# Patient Record
Sex: FEMALE | Race: WHITE | Employment: FULL TIME | URBAN - METROPOLITAN AREA
[De-identification: names, ages, dates, MRNs, and addresses within clinical notes are randomized per-mention and may not be internally consistent; named-entity substitution may affect disease eponyms.]

---

## 2018-03-14 ENCOUNTER — OFFICE VISIT (OUTPATIENT)
Dept: ORTHOPEDIC SURGERY | Age: 58
End: 2018-03-14
Payer: COMMERCIAL

## 2018-03-14 VITALS
WEIGHT: 175 LBS | HEIGHT: 68 IN | SYSTOLIC BLOOD PRESSURE: 131 MMHG | BODY MASS INDEX: 26.52 KG/M2 | HEART RATE: 64 BPM | DIASTOLIC BLOOD PRESSURE: 88 MMHG

## 2018-03-14 DIAGNOSIS — M25.511 ACUTE PAIN OF RIGHT SHOULDER: Primary | ICD-10-CM

## 2018-03-14 PROCEDURE — 99203 OFFICE O/P NEW LOW 30 MIN: CPT | Performed by: ORTHOPAEDIC SURGERY

## 2018-03-14 RX ORDER — PANTOPRAZOLE SODIUM 40 MG/1
40 GRANULE, DELAYED RELEASE ORAL
COMMUNITY
End: 2019-06-03 | Stop reason: DRUGHIGH

## 2018-03-14 RX ORDER — RANITIDINE 150 MG/1
150 CAPSULE ORAL DAILY
COMMUNITY
End: 2020-02-05

## 2018-03-14 RX ORDER — ATORVASTATIN CALCIUM 40 MG/1
40 TABLET, FILM COATED ORAL DAILY
COMMUNITY
End: 2019-09-11 | Stop reason: SDUPTHER

## 2018-03-14 RX ORDER — ALPRAZOLAM 0.5 MG/1
0.5 TABLET ORAL NIGHTLY PRN
COMMUNITY
End: 2019-06-03 | Stop reason: SDUPTHER

## 2018-03-14 RX ORDER — ATENOLOL 50 MG/1
50 TABLET ORAL DAILY
COMMUNITY
End: 2020-02-05 | Stop reason: SDUPTHER

## 2018-03-14 RX ORDER — LOSARTAN POTASSIUM 100 MG/1
100 TABLET ORAL DAILY
COMMUNITY
End: 2019-06-03

## 2018-03-14 RX ORDER — CITALOPRAM 40 MG/1
40 TABLET ORAL DAILY
COMMUNITY
End: 2019-06-03 | Stop reason: SDUPTHER

## 2018-03-14 RX ORDER — AMLODIPINE BESYLATE 10 MG/1
10 TABLET ORAL DAILY
COMMUNITY
End: 2019-06-03 | Stop reason: ALTCHOICE

## 2018-03-14 RX ORDER — FLUTICASONE PROPIONATE 50 MCG
1 SPRAY, SUSPENSION (ML) NASAL DAILY
COMMUNITY

## 2018-03-25 ENCOUNTER — ANESTHESIA EVENT (OUTPATIENT)
Dept: OPERATING ROOM | Age: 58
End: 2018-03-25
Payer: COMMERCIAL

## 2018-03-26 ENCOUNTER — ANESTHESIA (OUTPATIENT)
Dept: OPERATING ROOM | Age: 58
End: 2018-03-26
Payer: COMMERCIAL

## 2018-03-26 ASSESSMENT — ENCOUNTER SYMPTOMS: STRIDOR: 0

## 2018-04-17 ENCOUNTER — ANESTHESIA EVENT (OUTPATIENT)
Dept: OPERATING ROOM | Age: 58
End: 2018-04-17
Payer: COMMERCIAL

## 2018-04-17 ENCOUNTER — HOSPITAL ENCOUNTER (OUTPATIENT)
Age: 58
Setting detail: OUTPATIENT SURGERY
Discharge: HOME | End: 2018-04-17
Attending: ORTHOPAEDIC SURGERY | Admitting: ORTHOPAEDIC SURGERY
Payer: COMMERCIAL

## 2018-04-17 ENCOUNTER — ANESTHESIA (OUTPATIENT)
Dept: OPERATING ROOM | Age: 58
End: 2018-04-17
Payer: COMMERCIAL

## 2018-04-17 VITALS
WEIGHT: 172 LBS | DIASTOLIC BLOOD PRESSURE: 73 MMHG | HEIGHT: 67 IN | TEMPERATURE: 96.8 F | SYSTOLIC BLOOD PRESSURE: 138 MMHG | OXYGEN SATURATION: 93 % | RESPIRATION RATE: 16 BRPM | HEART RATE: 60 BPM | BODY MASS INDEX: 27 KG/M2

## 2018-04-17 VITALS — SYSTOLIC BLOOD PRESSURE: 95 MMHG | DIASTOLIC BLOOD PRESSURE: 50 MMHG | OXYGEN SATURATION: 94 %

## 2018-04-17 DIAGNOSIS — M75.01 ADHESIVE CAPSULITIS OF RIGHT SHOULDER: Primary | ICD-10-CM

## 2018-04-17 PROCEDURE — 3700000000 HC ANESTHESIA ATTENDED CARE: Performed by: ORTHOPAEDIC SURGERY

## 2018-04-17 PROCEDURE — 7100000031 HC ASPR PHASE II RECOVERY - ADDTL 15 MIN: Performed by: ORTHOPAEDIC SURGERY

## 2018-04-17 PROCEDURE — 6360000002 HC RX W HCPCS: Performed by: NURSE ANESTHETIST, CERTIFIED REGISTERED

## 2018-04-17 PROCEDURE — 7100000030 HC ASPR PHASE II RECOVERY - FIRST 15 MIN: Performed by: ORTHOPAEDIC SURGERY

## 2018-04-17 PROCEDURE — 3700000001 HC ADD 15 MINUTES (ANESTHESIA): Performed by: ORTHOPAEDIC SURGERY

## 2018-04-17 PROCEDURE — 2500000003 HC RX 250 WO HCPCS: Performed by: NURSE ANESTHETIST, CERTIFIED REGISTERED

## 2018-04-17 PROCEDURE — 2580000003 HC RX 258: Performed by: ANESTHESIOLOGY

## 2018-04-17 PROCEDURE — 3600000011 HC SURGERY LEVEL 1  ADDTL 15MIN: Performed by: ORTHOPAEDIC SURGERY

## 2018-04-17 PROCEDURE — 6360000002 HC RX W HCPCS: Performed by: ORTHOPAEDIC SURGERY

## 2018-04-17 PROCEDURE — 3600000001 HC SURGERY LEVEL 1  BASE: Performed by: ORTHOPAEDIC SURGERY

## 2018-04-17 PROCEDURE — 7100000000 HC PACU RECOVERY - FIRST 15 MIN: Performed by: ORTHOPAEDIC SURGERY

## 2018-04-17 PROCEDURE — 23700 MNPJ ANES SHO JT FIXJ APRATS: CPT | Performed by: ORTHOPAEDIC SURGERY

## 2018-04-17 PROCEDURE — 7100000001 HC PACU RECOVERY - ADDTL 15 MIN: Performed by: ORTHOPAEDIC SURGERY

## 2018-04-17 PROCEDURE — 64415 NJX AA&/STRD BRCH PLXS IMG: CPT | Performed by: ANESTHESIOLOGY

## 2018-04-17 PROCEDURE — 2500000003 HC RX 250 WO HCPCS: Performed by: ORTHOPAEDIC SURGERY

## 2018-04-17 RX ORDER — BETAMETHASONE SODIUM PHOSPHATE AND BETAMETHASONE ACETATE 3; 3 MG/ML; MG/ML
INJECTION, SUSPENSION INTRA-ARTICULAR; INTRALESIONAL; INTRAMUSCULAR; SOFT TISSUE PRN
Status: DISCONTINUED | OUTPATIENT
Start: 2018-04-17 | End: 2018-04-17 | Stop reason: HOSPADM

## 2018-04-17 RX ORDER — MIDAZOLAM HYDROCHLORIDE 1 MG/ML
INJECTION INTRAMUSCULAR; INTRAVENOUS PRN
Status: DISCONTINUED | OUTPATIENT
Start: 2018-04-17 | End: 2018-04-17 | Stop reason: SDUPTHER

## 2018-04-17 RX ORDER — BUPIVACAINE HYDROCHLORIDE 5 MG/ML
INJECTION, SOLUTION EPIDURAL; INTRACAUDAL PRN
Status: DISCONTINUED | OUTPATIENT
Start: 2018-04-17 | End: 2018-04-17 | Stop reason: HOSPADM

## 2018-04-17 RX ORDER — MORPHINE SULFATE 2 MG/ML
2 INJECTION, SOLUTION INTRAMUSCULAR; INTRAVENOUS EVERY 5 MIN PRN
Status: DISCONTINUED | OUTPATIENT
Start: 2018-04-17 | End: 2018-04-17 | Stop reason: HOSPADM

## 2018-04-17 RX ORDER — LIDOCAINE HYDROCHLORIDE 10 MG/ML
INJECTION, SOLUTION EPIDURAL; INFILTRATION; INTRACAUDAL; PERINEURAL PRN
Status: DISCONTINUED | OUTPATIENT
Start: 2018-04-17 | End: 2018-04-17 | Stop reason: SDUPTHER

## 2018-04-17 RX ORDER — ONDANSETRON 2 MG/ML
4 INJECTION INTRAMUSCULAR; INTRAVENOUS
Status: DISCONTINUED | OUTPATIENT
Start: 2018-04-17 | End: 2018-04-17 | Stop reason: HOSPADM

## 2018-04-17 RX ORDER — OXYCODONE HYDROCHLORIDE AND ACETAMINOPHEN 5; 325 MG/1; MG/1
1-2 TABLET ORAL EVERY 4 HOURS PRN
Qty: 30 TABLET | Refills: 0 | Status: SHIPPED | OUTPATIENT
Start: 2018-04-17 | End: 2019-04-17

## 2018-04-17 RX ORDER — SODIUM CHLORIDE, SODIUM LACTATE, POTASSIUM CHLORIDE, CALCIUM CHLORIDE 600; 310; 30; 20 MG/100ML; MG/100ML; MG/100ML; MG/100ML
INJECTION, SOLUTION INTRAVENOUS CONTINUOUS
Status: DISCONTINUED | OUTPATIENT
Start: 2018-04-17 | End: 2018-04-17 | Stop reason: HOSPADM

## 2018-04-17 RX ORDER — LABETALOL HYDROCHLORIDE 5 MG/ML
5 INJECTION, SOLUTION INTRAVENOUS EVERY 10 MIN PRN
Status: DISCONTINUED | OUTPATIENT
Start: 2018-04-17 | End: 2018-04-17 | Stop reason: HOSPADM

## 2018-04-17 RX ORDER — FENTANYL CITRATE 50 UG/ML
INJECTION, SOLUTION INTRAMUSCULAR; INTRAVENOUS PRN
Status: DISCONTINUED | OUTPATIENT
Start: 2018-04-17 | End: 2018-04-17 | Stop reason: SDUPTHER

## 2018-04-17 RX ORDER — PROPOFOL 10 MG/ML
INJECTION, EMULSION INTRAVENOUS PRN
Status: DISCONTINUED | OUTPATIENT
Start: 2018-04-17 | End: 2018-04-17 | Stop reason: SDUPTHER

## 2018-04-17 RX ORDER — DIPHENHYDRAMINE HYDROCHLORIDE 50 MG/ML
12.5 INJECTION INTRAMUSCULAR; INTRAVENOUS
Status: DISCONTINUED | OUTPATIENT
Start: 2018-04-17 | End: 2018-04-17 | Stop reason: HOSPADM

## 2018-04-17 RX ORDER — ONDANSETRON 2 MG/ML
INJECTION INTRAMUSCULAR; INTRAVENOUS PRN
Status: DISCONTINUED | OUTPATIENT
Start: 2018-04-17 | End: 2018-04-17 | Stop reason: SDUPTHER

## 2018-04-17 RX ADMIN — PROPOFOL 180 MG: 10 INJECTION, EMULSION INTRAVENOUS at 11:26

## 2018-04-17 RX ADMIN — MIDAZOLAM 2 MG: 1 INJECTION INTRAMUSCULAR; INTRAVENOUS at 11:24

## 2018-04-17 RX ADMIN — SODIUM CHLORIDE, POTASSIUM CHLORIDE, SODIUM LACTATE AND CALCIUM CHLORIDE: 600; 310; 30; 20 INJECTION, SOLUTION INTRAVENOUS at 09:57

## 2018-04-17 RX ADMIN — FENTANYL CITRATE 50 MCG: 50 INJECTION, SOLUTION INTRAMUSCULAR; INTRAVENOUS at 11:26

## 2018-04-17 RX ADMIN — LIDOCAINE HYDROCHLORIDE 50 MG: 10 INJECTION, SOLUTION EPIDURAL; INFILTRATION; INTRACAUDAL; PERINEURAL at 11:26

## 2018-04-17 RX ADMIN — FENTANYL CITRATE 50 MCG: 50 INJECTION, SOLUTION INTRAMUSCULAR; INTRAVENOUS at 11:24

## 2018-04-17 RX ADMIN — ONDANSETRON 4 MG: 2 INJECTION INTRAMUSCULAR; INTRAVENOUS at 11:40

## 2018-04-17 ASSESSMENT — PULMONARY FUNCTION TESTS
PIF_VALUE: 1
PIF_VALUE: 2
PIF_VALUE: 13
PIF_VALUE: 28
PIF_VALUE: 14
PIF_VALUE: 0
PIF_VALUE: 0
PIF_VALUE: 26
PIF_VALUE: 1
PIF_VALUE: 0
PIF_VALUE: 2
PIF_VALUE: 0
PIF_VALUE: 1
PIF_VALUE: 0
PIF_VALUE: 0
PIF_VALUE: 16
PIF_VALUE: 2
PIF_VALUE: 15
PIF_VALUE: 0
PIF_VALUE: 17
PIF_VALUE: 0
PIF_VALUE: 0
PIF_VALUE: 15
PIF_VALUE: 0
PIF_VALUE: 1
PIF_VALUE: 1
PIF_VALUE: 15

## 2018-04-17 ASSESSMENT — PAIN SCALES - GENERAL
PAINLEVEL_OUTOF10: 0

## 2018-04-17 ASSESSMENT — PAIN DESCRIPTION - DESCRIPTORS: DESCRIPTORS: ACHING

## 2018-04-17 ASSESSMENT — ENCOUNTER SYMPTOMS: STRIDOR: 0

## 2018-04-17 ASSESSMENT — PAIN DESCRIPTION - PAIN TYPE: TYPE: ACUTE PAIN

## 2018-04-17 ASSESSMENT — PAIN - FUNCTIONAL ASSESSMENT: PAIN_FUNCTIONAL_ASSESSMENT: 0-10

## 2018-05-02 ENCOUNTER — OFFICE VISIT (OUTPATIENT)
Dept: ORTHOPEDIC SURGERY | Age: 58
End: 2018-05-02

## 2018-05-02 DIAGNOSIS — M75.01 ADHESIVE CAPSULITIS OF RIGHT SHOULDER: Primary | ICD-10-CM

## 2018-05-02 PROCEDURE — 99024 POSTOP FOLLOW-UP VISIT: CPT | Performed by: ORTHOPAEDIC SURGERY

## 2019-06-03 ENCOUNTER — OFFICE VISIT (OUTPATIENT)
Dept: FAMILY MEDICINE CLINIC | Age: 59
End: 2019-06-03
Payer: COMMERCIAL

## 2019-06-03 VITALS
SYSTOLIC BLOOD PRESSURE: 144 MMHG | WEIGHT: 174 LBS | DIASTOLIC BLOOD PRESSURE: 82 MMHG | HEIGHT: 67 IN | BODY MASS INDEX: 27.31 KG/M2 | TEMPERATURE: 97.9 F | RESPIRATION RATE: 16 BRPM | HEART RATE: 64 BPM

## 2019-06-03 DIAGNOSIS — I10 ESSENTIAL HYPERTENSION: Primary | ICD-10-CM

## 2019-06-03 DIAGNOSIS — E78.00 PURE HYPERCHOLESTEROLEMIA: ICD-10-CM

## 2019-06-03 DIAGNOSIS — R73.9 HYPERGLYCEMIA: ICD-10-CM

## 2019-06-03 DIAGNOSIS — F41.9 ANXIETY: ICD-10-CM

## 2019-06-03 PROCEDURE — 99203 OFFICE O/P NEW LOW 30 MIN: CPT | Performed by: INTERNAL MEDICINE

## 2019-06-03 RX ORDER — CITALOPRAM 40 MG/1
40 TABLET ORAL DAILY
Qty: 90 TABLET | Refills: 1 | Status: SHIPPED | OUTPATIENT
Start: 2019-06-03 | End: 2020-02-05 | Stop reason: SDUPTHER

## 2019-06-03 RX ORDER — VALSARTAN 40 MG/1
40 TABLET ORAL DAILY
COMMUNITY
End: 2019-11-27

## 2019-06-03 RX ORDER — ALPRAZOLAM 0.5 MG/1
0.5 TABLET ORAL NIGHTLY PRN
Qty: 30 TABLET | Refills: 0 | Status: SHIPPED | OUTPATIENT
Start: 2019-06-03 | End: 2019-07-03

## 2019-06-03 RX ORDER — PANTOPRAZOLE SODIUM 40 MG/1
40 GRANULE, DELAYED RELEASE ORAL
Status: SHIPPED | COMMUNITY
Start: 2019-06-03 | End: 2020-02-05 | Stop reason: SDUPTHER

## 2019-06-03 ASSESSMENT — PATIENT HEALTH QUESTIONNAIRE - PHQ9
SUM OF ALL RESPONSES TO PHQ QUESTIONS 1-9: 0
1. LITTLE INTEREST OR PLEASURE IN DOING THINGS: 0
SUM OF ALL RESPONSES TO PHQ9 QUESTIONS 1 & 2: 0
2. FEELING DOWN, DEPRESSED OR HOPELESS: 0
SUM OF ALL RESPONSES TO PHQ QUESTIONS 1-9: 0

## 2019-06-03 NOTE — PROGRESS NOTES
Chronic Disease Visit Information    BP Readings from Last 3 Encounters:   04/17/18 138/73   04/17/18 (!) 95/50   03/26/18 126/76          BUN (mg/dL)   Date Value   10/01/2013 17     CREATININE (mg/dL)   Date Value   10/01/2013 0.91     Glucose (mg/dL)   Date Value   10/01/2013 111 (H)            Have you changed or started any medications since your last visit including any over-the-counter medicines, vitamins, or herbal medicines? no   Are you having any side effects from any of your medications? -  no  Have you stopped taking any of your medications? Is so, why? -  no    Have you seen any other physician or provider since your last visit? No  Have you had any other diagnostic tests since your last visit? No  Have you been seen in the emergency room and/or had an admission to a hospital since we last saw you? No  Have you had your annual diabetic retinal (eye) exam? No  Have you had your routine dental cleaning in the past 6 months? no    Have you activated your [employer] account? If not, what are your barriers?  No:      Patient Care Team:  Linzie Goldberg, APRN - CNP as PCP - General (Family Medicine)         Medical History Review  Past Medical, Family, and Social History reviewed and does not contribute to the patient presenting condition    Health Maintenance   Topic Date Due    Hepatitis C screen  1960    HIV screen  11/14/1977    DTaP/Tdap/Td vaccine (1 - Tdap) 11/14/1981    Cervical cancer screen  11/14/1983    Lipid screen  11/14/2002    Breast cancer screen  11/14/2012    Shingles Vaccine (1 of 2) 11/14/2012    Colon cancer screen colonoscopy  11/14/2012    Potassium monitoring  10/01/2014    Creatinine monitoring  10/01/2014    Flu vaccine (Season Ended) 09/01/2019    Pneumococcal 0-64 years Vaccine  Aged Abel

## 2019-06-03 NOTE — PROGRESS NOTES
[facility]    New Patient Note/History and Physical    Date of patient's visit: 6/3/2019    Name: Rachel Tapia      YOB: 1960     Patient Care Team:  ELADIA Valdez CNP as PCP - General (Family Medicine)    REASON FOR VISIT: First Visit, establish care     Chief Complaint   Patient presents with    Establish Care       HISTORY OF PRESENTING ILLNESS:    History was obtained from the patient. Rachel Tapia is a 66 y.o. is here to establish care. She has a history of hypertension, hyperlipidemia and anxiety disorder. She also was diagnosed with eosinophilic esophagitis. .  She has a long history of GERD. She is currently on PPI 2 times a day. She had a recent esophageal dilation and biopsy which confirmed eosinophilic esophagitis. She is doing better since being on a PPI. She needs refills on citalopram and Xanax. She takes one tablet of Xanax maybe once a week for severe anxiety. It started after the death of her child and other personal issues. She is up-to-date with mammogram and Pap smear. She sees a gynecologist at [facility].  She also had Cologard testing last year which was normal.      Final Pathologic Diagnosis    1. Stomach, biopsy:         Gastric mucosa with patchy reactive foveolar changes.        Comment: The reactive foveolar changes are nonspecific. There is no evidence of Helicobacter pylori on H&E sections.        2. \"Gastric body polyps\", biopsy:         Fundic gland polyps.        3. \"Gastric fundus polyp\", biopsy:         Fundic type gastric mucosa with no significant pathologic findings.        4. GE junction, biopsy:         Chronically inflamed squamous and gastric-type mucosa.        Comment: There is no evidence of intestinal metaplasia or dysplasia.        5.  Distal esophagus, biopsy:         Chronic esophagitis with eosinophils.        Patient Name: Seth Argueta Procedure Date: 3/11/2019 8:34 AM   MRN: [id number]   Account Number: [de-identified]   YOB: 1960    Admit Type: Outpatient    Age: 60    Room: [facility] 03    Gender: Female    Note Status: 7 [facility]   Attending MD: Trip Mcdaniel MD    Procedure:            Upper GI endoscopy    [address] MD    Referring MD:         Dara Reyes    Requesting Provider:      Medicines:            Midazolam 6 mg IV, Fentanyl 200 micrograms IV    Complications:        No immediate complications.    Procedure:            After obtaining informed consent, the endoscope was                           passed under direct vision. Throughout the procedure,                           the patient's blood pressure, pulse, and oxygen                           saturations were monitored continuously. The Endoscope                           was introduced through the mouth, and advanced to the                           second part of duodenum. The upper GI endoscopy was                           accomplished without difficulty. The patient tolerated                           the procedure well.    Findings:         The proximal esophagus and distal esophagus were normal. Biopsies were          taken with a cold forceps for histology.         One benign-appearing, intrinsic moderate stenosis was found 35 cm from          the incisors. This stenosis measured less than one cm (in length). The          stenosis was traversed. A TTS dilator was passed through the scope.          Dilation with a 10-11-12 mm balloon dilator was performed to 12 mm. The          dilation site was examined and showed mild improvement in luminal          narrowing. Biopsies were taken with a cold forceps for histology.         Multiple small sessile polyps were found in the gastric fundus.  Biopsies          were taken with a cold forceps for histology.         Multiple small sessile polyps were found                       - Mechanical soft diet.                          - Continue present medications including daily Protonix.                           - Await pathology results.                          - Follow an antireflux regimen.    Sepideh Coppola MD        MRI Brain 2018  Result Narrative     History:  A 65-year-old female with the history of the asymmetric sensory neuronal hearing loss.  Prior history of resection of the tonsils and adenoids in the child who and nasal polyp removed in 2013. Technique:  Multiplanar and multisequence MRI examination of the brain and internal auditory canals is performed without and with intravenous contrast administration. Comparison:  None available. Findings:  Images through the temporal regions demonstrate normal and symmetrical internal auditory canals. The cranial nerves VII and VIII are normal and symmetrical bilaterally. There is no evidence of abnormal enhancing lesion to suggest acoustic neuroma or mass. The cerebellopontine angles are   clear. The Meckel caves are normal. The cochleae and semicircular canals are unremarkable. The mastoid air cells are clear. The ventricular system is normal in size and configuration. There is normal differentiation of gray and white matter. There is no evidence of intracranial mass or focal area of signal abnormality. No mass effect, midline shift of the structures or extraaxial fluid collections are noted. There is no   evidence of restricted diffusion to suggest acute or subacute age of infarction. Cerebellum and brainstem are unremarkable. Postcontrast examination reveals no abnormal meningeal or parenchymal enhancement. The cavernous carotid and vertebrobasilar arteries are patent. Paranasal sinuses are clear.     IMPRESSION:     1. Cranial nerves VII and VIII are normal and symmetrical. The internal auditory canals are normal.  2. No evidence of acoustic neuroma or mass lesion in the cerebellopontine angles. 3. Cochleae and semicircular canals are unremarkable. 4. No evidence of intracranial mass, abnormal enhancing lesion or focal signal abnormality. 5. Paranasal sinuses and mastoid air cells are clear. Creatinine includes GFR, serum (06/23/2018 11:43 AM EDT)  Creatinine includes GFR, serum (06/23/2018 11:43 AM EDT)   Component Value Ref Range Performed At Pathologist Signature   Creatinine 1.02 (H)Comment: METHOD TRACEABLE TO IDMS STANDARD 0.40 - 1.00 mg/dL [facility] LABORATORY     GFR MDRD Non Af Amer 56 (L) >59 ml/min/1.73sq.m [facility] 1729     GFR MDRD Af Amer >60 >59 ml/min/1.73sq.m [facility]ORY       Creatinine includes GFR, serum (06/23/2018 11:43 AM EDT)   Specimen         Creatinine includes GFR, serum (06/23/2018 11:43 AM EDT)   Performing Organization Address City/[facility]/Zipcode Phone Number   [facility] [address]  [address] [facility], [facility] [address]   [address] [address]      Back to top of Lab Results       Hepatic function panel (06/23/2018 11:43 AM EDT)  Hepatic function panel (06/23/2018 11:43 AM EDT)   Component Value Ref Range Performed At Pathologist Signature   Alkaline phosphatase 99 39 - 130 U/L [place] [address]     AST 18 0 - 41 U/L [facility]     ALT 20 0 - 31 U/L [facility]     Total Bilirubin 0.6 0.3 - 1.2 mg/dL [facility]     Bilirubin, direct 0.1 0.0 - 0.4 mg/dL [facility]     Albumin 4.0 3.2 - 5.3 g/dL [facility] [place] [address]     Total Protein 7.0 6.0 - 8.0 g/dL [place] [address]       Hepatic function panel (06/23/2018 11:43 AM EDT)   Specimen   Blood     Hepatic function panel (06/23/2018 11:43 AM EDT)   Performing Organization Address City/State/Zipcode Phone Number   [phone] [facility] [place] LABORATORY   [address] [facility], [address]   [address]  [address] [place] [id number]      Back to top of Lab Results       Lipid panel (06/23/2018 11:43 AM EDT)  Lipid panel (06/23/2018 11:43 AM EDT)   Component Value Ref Range Performed At Pathologist Signature   Cholesterol 137 (L) 150 - 200 mg/dL [facility] LAB[facility]ATORY     Triglycerides 139 27 - 150 mg/dL [facility] LABORATORY     HDL 38 (L)  Comment:         HDL <40 mg/dL - High Risk  HDL > or = 40mg/dL- Desirable  HDL >60 mg/dL - Negative Risk  ---------------------------------------------   >39 mg/dL [facility] LABORATORY     Very low lipoprotein 28 0 - 30 mg/dL [facility] LAB[facility]     LDL (calc) 71  Comment:         LDL    <100 mg/dL - Desirable  LDL    >160 mg/dL - High Risk  ---------------------------------------------   <130 mg/dL [facility] LAB[facility]AT[facility]     Cholesterol hdl 3.6 1.0 - 5.0 [facility]ORY       Lipid panel (06/23/2018 11:43 AM EDT)   Specimen     Care Everywhere Result Report  Cologuard Non-ProMedicaResulted: 2/13/2018 11:35 AM  [address] [place] [address]  Component Name Value Ref Range   EXTERNAL COLOGUARD Negative     Result Narrative   This result has an attachment that is not available.          PAST MEDICALAND SURGICAL HISTORY:          Diagnosis Date    Anxiety     Depression     Eosinophilic esophagitis     Fracture     right clavicle, right wrist, knee    GERD (gastroesophageal reflux disease)     Hearing loss     Hyperlipidemia     Hypertension     Sleep apnea            Procedure Laterality Date    LEEP      NASAL POLYP SURGERY      AL MANIPULATN SHLDR JT W ANESTHESIA Right 4/17/2018    SHOULDER MANIPULATION with steroid injection performed by Ventura Patel MD at [address]        SOCIAL HISTORY:    TOBACCO:   reports that she has never smoked. She has never used smokeless tobacco.  ETOH:   reports that she drinks alcohol. DRUGS:  reports that she does not use drugs. OCCUPATION:      ALLERGIES:      Allergies   Allergen Reactions    Sulfamethoxazole-Trimethoprim      Throat swelling          HOME MEDICATION:      Current Outpatient Medications on File Prior to Visit   Medication Sig Dispense Refill    valsartan (DIOVAN) 40 MG tablet Take 40 mg by mouth daily      pantoprazole sodium (PROTONIX) 40 MG PACK packet Take 1 packet by mouth 2 times daily (before meals)      ranitidine (ZANTAC) 150 MG capsule Take 150 mg by mouth daily      atorvastatin (LIPITOR) 40 MG tablet Take 40 mg by mouth daily      atenolol (TENORMIN) 50 MG tablet Take 50 mg by mouth daily      fluticasone (FLONASE) 50 MCG/ACT nasal spray 1 spray by Nasal route daily       No current facility-administered medications on file prior to visit. FAMILY HISTORY:          Problem Relation Age of Onset    Cancer Mother         cervical ca    Other Mother         PE    Diabetes Father     High Blood Pressure Father     High Cholesterol Father     Other Sister         brain aneurysm       REVIEW OF SYSTEMS:    Review of Systems   Constitutional: Negative for chills, fatigue and unexpected weight change. HENT: Positive for hearing loss. Negative for congestion. Eyes: Negative for pain and visual disturbance. Respiratory: Negative for cough, shortness of breath and wheezing. Cardiovascular: Negative for chest pain, palpitations and leg swelling. Gastrointestinal: Negative for abdominal pain, blood in stool, constipation and diarrhea. Endocrine: Negative for polydipsia and polyuria. Genitourinary: Negative for dysuria and frequency. Neurological: Negative for dizziness, seizures, weakness, numbness and headaches. Psychiatric/Behavioral: Positive for sleep disturbance.  Negative for dysphoric mood. The patient is nervous/anxious. PHYSICAL EXAM:      Vitals:    06/03/19 1541   BP: (!) 144/82   Site: Right Upper Arm   Position: Sitting   Cuff Size: Large Adult   Pulse: 64   Resp: 16   Temp: 97.9 °F (36.6 °C)   TempSrc: Oral   Weight: 174 lb (78.9 kg)   Height: 5' 7.01\" (1.702 m)     Wt Readings from Last 3 Encounters:   06/03/19 174 lb (78.9 kg)   04/17/18 172 lb (78 kg)   03/26/18 175 lb (79.4 kg)     Body mass index is 27.25 kg/m². Physical Exam   Constitutional: She is oriented to person, place, and time. She appears well-developed and well-nourished. No distress. HENT:   Head: Normocephalic and atraumatic. Mouth/Throat: Oropharynx is clear and moist.   Eyes: Pupils are equal, round, and reactive to light. Conjunctivae and EOM are normal.   Neck: Normal range of motion. Neck supple. No thyromegaly present. Cardiovascular: Normal rate and regular rhythm. No murmur heard. Pulmonary/Chest: Effort normal and breath sounds normal. She has no wheezes. Abdominal: Soft. Bowel sounds are normal. There is no tenderness. Musculoskeletal: She exhibits no edema. Neurological: She is alert and oriented to person, place, and time. Skin: Skin is warm and dry. She is not diaphoretic. Psychiatric: She has a normal mood and affect. Nursing note and vitals reviewed.         LABORATORY FINDINGS:    CBC:   Lab Results   Component Value Date    WBC 5.0 10/01/2013    HGB 13.8 10/01/2013     10/01/2013     BMP:    Lab Results   Component Value Date     10/01/2013    K 4.0 10/01/2013     10/01/2013    CO2 27 10/01/2013    BUN 17 10/01/2013    CREATININE 0.91 10/01/2013    GLUCOSE 111 10/01/2013     Hemoglobin A1C: No results found for: LABA1C  Lipid profile: No results found for: CHOL, TRIG, HDL  No results found for: LDLCALC, LDLCHOLESTEROL, LDLDIRECT    Thyroid functions: No results found for: TSH   Hepatic functions: No results found for: ALT, AST, PROT, BILITOT,

## 2019-06-10 ASSESSMENT — ENCOUNTER SYMPTOMS
CONSTIPATION: 0
DIARRHEA: 0
ABDOMINAL PAIN: 0
EYE PAIN: 0
WHEEZING: 0
SHORTNESS OF BREATH: 0
COUGH: 0
BLOOD IN STOOL: 0

## 2019-07-26 ENCOUNTER — HOSPITAL ENCOUNTER (OUTPATIENT)
Age: 59
Setting detail: SPECIMEN
Discharge: HOME | End: 2019-07-26
Payer: COMMERCIAL

## 2019-07-26 DIAGNOSIS — E78.00 PURE HYPERCHOLESTEROLEMIA: ICD-10-CM

## 2019-07-26 DIAGNOSIS — R73.9 HYPERGLYCEMIA: ICD-10-CM

## 2019-07-26 DIAGNOSIS — I10 ESSENTIAL HYPERTENSION: ICD-10-CM

## 2019-07-26 LAB
ALBUMIN SERPL-MCNC: 4.2 G/DL (ref 3.5–5.2)
ALBUMIN/GLOBULIN RATIO: 1.4 (ref 1–2.5)
ALP BLD-CCNC: 100 U/L (ref 35–104)
ALT SERPL-CCNC: 21 U/L (ref 5–33)
ANION GAP SERPL CALCULATED.3IONS-SCNC: 13 MMOL/L (ref 9–17)
AST SERPL-CCNC: 21 U/L
BILIRUB SERPL-MCNC: 0.33 MG/DL (ref 0.3–1.2)
BUN BLDV-MCNC: 12 MG/DL (ref 6–20)
BUN/CREAT BLD: ABNORMAL (ref 9–20)
CALCIUM SERPL-MCNC: 9.5 MG/DL (ref 8.6–10.4)
CHLORIDE BLD-SCNC: 104 MMOL/L (ref 98–107)
CHOLESTEROL/HDL RATIO: 5.9
CHOLESTEROL: 235 MG/DL
CO2: 24 MMOL/L (ref 20–31)
CREAT SERPL-MCNC: 0.93 MG/DL (ref 0.5–0.9)
ESTIMATED AVERAGE GLUCOSE: 123 MG/DL
GFR AFRICAN AMERICAN: >60 ML/MIN
GFR NON-AFRICAN AMERICAN: >60 ML/MIN
GFR SERPL CREATININE-BSD FRML MDRD: ABNORMAL ML/MIN/{1.73_M2}
GFR SERPL CREATININE-BSD FRML MDRD: ABNORMAL ML/MIN/{1.73_M2}
GLUCOSE BLD-MCNC: 104 MG/DL (ref 70–99)
HBA1C MFR BLD: 5.9 % (ref 4–6)
HDLC SERPL-MCNC: 40 MG/DL
LDL CHOLESTEROL: 163 MG/DL (ref 0–130)
POTASSIUM SERPL-SCNC: 4.3 MMOL/L (ref 3.7–5.3)
SODIUM BLD-SCNC: 141 MMOL/L (ref 135–144)
TOTAL PROTEIN: 7.3 G/DL (ref 6.4–8.3)
TRIGL SERPL-MCNC: 161 MG/DL
VLDLC SERPL CALC-MCNC: ABNORMAL MG/DL (ref 1–30)

## 2019-07-26 PROCEDURE — 80053 COMPREHEN METABOLIC PANEL: CPT

## 2019-07-26 PROCEDURE — 80061 LIPID PANEL: CPT

## 2019-07-26 PROCEDURE — 36415 COLL VENOUS BLD VENIPUNCTURE: CPT

## 2019-07-26 PROCEDURE — 83036 HEMOGLOBIN GLYCOSYLATED A1C: CPT

## 2019-08-01 ENCOUNTER — OFFICE VISIT (OUTPATIENT)
Dept: FAMILY MEDICINE CLINIC | Age: 59
End: 2019-08-01
Payer: COMMERCIAL

## 2019-08-01 VITALS
BODY MASS INDEX: 27.81 KG/M2 | HEIGHT: 67 IN | TEMPERATURE: 98 F | HEART RATE: 73 BPM | SYSTOLIC BLOOD PRESSURE: 131 MMHG | RESPIRATION RATE: 16 BRPM | DIASTOLIC BLOOD PRESSURE: 85 MMHG | WEIGHT: 177.2 LBS

## 2019-08-01 DIAGNOSIS — R73.9 HYPERGLYCEMIA: ICD-10-CM

## 2019-08-01 DIAGNOSIS — E78.00 PURE HYPERCHOLESTEROLEMIA: ICD-10-CM

## 2019-08-01 DIAGNOSIS — Z82.49 FAMILY HISTORY OF BRAIN ANEURYSM: ICD-10-CM

## 2019-08-01 DIAGNOSIS — Z82.49 FAMILY HISTORY OF CORONARY ARTERY DISEASE IN FATHER: ICD-10-CM

## 2019-08-01 DIAGNOSIS — F41.9 ANXIETY: ICD-10-CM

## 2019-08-01 DIAGNOSIS — I10 ESSENTIAL HYPERTENSION: Primary | ICD-10-CM

## 2019-08-01 PROCEDURE — 99214 OFFICE O/P EST MOD 30 MIN: CPT | Performed by: PHYSICIAN ASSISTANT

## 2019-08-01 ASSESSMENT — ENCOUNTER SYMPTOMS
BACK PAIN: 0
WHEEZING: 0
SHORTNESS OF BREATH: 0
DIARRHEA: 0
SINUS PAIN: 0
SORE THROAT: 0
CHEST TIGHTNESS: 0
COUGH: 0
BLOOD IN STOOL: 0
CONSTIPATION: 0
ABDOMINAL PAIN: 0
NAUSEA: 0
VOMITING: 0

## 2019-08-01 NOTE — PROGRESS NOTES
Visit Information    Have you changed or started any medications since your last visit including any over-the-counter medicines, vitamins, or herbal medicines? no   Are you having any side effects from any of your medications? -  no  Have you stopped taking any of your medications? Is so, why? -  no    Have you seen any other physician or provider since your last visit? No  Have you had any other diagnostic tests since your last visit? No  Have you been seen in the emergency room and/or had an admission to a hospital since we last saw you? No  Have you had your routine dental cleaning in the past 6 months? no    Have you activated your [employer] account? If not, what are your barriers?  Yes     Patient Care Team:  Amber Lee PA-C as PCP - General (Physician Assistant)  Amber Lee PA-C as PCP - [facility]    Medical History Review  Past Medical, Family, and Social History reviewed and does not contribute to the patient presenting condition    Health Maintenance   Topic Date Due    Hepatitis C screen  1960    HIV screen  11/14/1977    DTaP/Tdap/Td vaccine (1 - Tdap) 11/14/1981    Cervical cancer screen  11/14/1983    Breast cancer screen  11/14/2012    Shingles Vaccine (1 of 2) 11/14/2012    Colon cancer screen colonoscopy  11/14/2012    Flu vaccine (1) 09/01/2019    A1C test (Diabetic or Prediabetic)  07/26/2020    Potassium monitoring  07/26/2020    Creatinine monitoring  07/26/2020    Lipid screen  07/26/2024    Pneumococcal 0-64 years Vaccine  Aged Out

## 2019-11-27 PROBLEM — R07.2 PRECORDIAL PAIN: Status: ACTIVE | Noted: 2019-11-27

## 2019-11-27 PROBLEM — E78.00 PURE HYPERCHOLESTEROLEMIA: Status: ACTIVE | Noted: 2019-11-27

## 2019-11-27 PROBLEM — R00.2 PALPITATIONS: Status: ACTIVE | Noted: 2019-11-27

## 2019-11-27 PROBLEM — I10 ESSENTIAL HYPERTENSION: Status: ACTIVE | Noted: 2019-11-27

## 2020-02-05 ENCOUNTER — OFFICE VISIT (OUTPATIENT)
Dept: FAMILY MEDICINE CLINIC | Age: 60
End: 2020-02-05
Payer: COMMERCIAL

## 2020-02-05 VITALS
HEART RATE: 69 BPM | WEIGHT: 184.8 LBS | HEIGHT: 68 IN | BODY MASS INDEX: 28.01 KG/M2 | DIASTOLIC BLOOD PRESSURE: 76 MMHG | SYSTOLIC BLOOD PRESSURE: 125 MMHG | RESPIRATION RATE: 16 BRPM | TEMPERATURE: 98 F

## 2020-02-05 PROCEDURE — 99213 OFFICE O/P EST LOW 20 MIN: CPT | Performed by: PHYSICIAN ASSISTANT

## 2020-02-05 RX ORDER — ATENOLOL 50 MG/1
50 TABLET ORAL DAILY
Qty: 90 TABLET | Refills: 1 | Status: SHIPPED | OUTPATIENT
Start: 2020-02-05 | End: 2020-08-24

## 2020-02-05 RX ORDER — CITALOPRAM 40 MG/1
40 TABLET ORAL DAILY
Qty: 90 TABLET | Refills: 1 | Status: SHIPPED | OUTPATIENT
Start: 2020-02-05 | End: 2020-08-24

## 2020-02-05 RX ORDER — VALSARTAN 160 MG/1
160 TABLET ORAL DAILY
Qty: 90 TABLET | Refills: 3 | Status: SHIPPED | OUTPATIENT
Start: 2020-02-05

## 2020-02-05 RX ORDER — MAGNESIUM OXIDE 400 MG/1
400 TABLET ORAL DAILY
COMMUNITY

## 2020-02-05 RX ORDER — PANTOPRAZOLE SODIUM 40 MG/1
40 GRANULE, DELAYED RELEASE ORAL
Qty: 60 EACH | Refills: 1 | Status: SHIPPED | OUTPATIENT
Start: 2020-02-05

## 2020-02-05 RX ORDER — ATORVASTATIN CALCIUM 80 MG/1
80 TABLET, FILM COATED ORAL DAILY
Qty: 90 TABLET | Refills: 3 | Status: SHIPPED | OUTPATIENT
Start: 2020-02-05 | End: 2021-03-01 | Stop reason: SDUPTHER

## 2020-02-05 RX ORDER — FAMCICLOVIR 500 MG/1
TABLET, FILM COATED ORAL
COMMUNITY
Start: 2020-01-06

## 2020-02-05 SDOH — ECONOMIC STABILITY: TRANSPORTATION INSECURITY
IN THE PAST 12 MONTHS, HAS THE LACK OF TRANSPORTATION KEPT YOU FROM MEDICAL APPOINTMENTS OR FROM GETTING MEDICATIONS?: NO

## 2020-02-05 SDOH — ECONOMIC STABILITY: INCOME INSECURITY: HOW HARD IS IT FOR YOU TO PAY FOR THE VERY BASICS LIKE FOOD, HOUSING, MEDICAL CARE, AND HEATING?: NOT HARD AT ALL

## 2020-02-05 SDOH — ECONOMIC STABILITY: FOOD INSECURITY: WITHIN THE PAST 12 MONTHS, YOU WORRIED THAT YOUR FOOD WOULD RUN OUT BEFORE YOU GOT MONEY TO BUY MORE.: NEVER TRUE

## 2020-02-05 SDOH — ECONOMIC STABILITY: TRANSPORTATION INSECURITY
IN THE PAST 12 MONTHS, HAS LACK OF TRANSPORTATION KEPT YOU FROM MEETINGS, WORK, OR FROM GETTING THINGS NEEDED FOR DAILY LIVING?: NO

## 2020-02-05 SDOH — ECONOMIC STABILITY: FOOD INSECURITY: WITHIN THE PAST 12 MONTHS, THE FOOD YOU BOUGHT JUST DIDN'T LAST AND YOU DIDN'T HAVE MONEY TO GET MORE.: NEVER TRUE

## 2020-02-05 ASSESSMENT — ENCOUNTER SYMPTOMS
ABDOMINAL DISTENTION: 0
BLOOD IN STOOL: 0
RHINORRHEA: 0
SORE THROAT: 0
COUGH: 0
NAUSEA: 0
DIARRHEA: 0
EYE REDNESS: 0
CHEST TIGHTNESS: 0
SINUS PRESSURE: 0
VOMITING: 0
BACK PAIN: 0
WHEEZING: 0
PHOTOPHOBIA: 0
CONSTIPATION: 0
COLOR CHANGE: 0
SINUS PAIN: 0
TROUBLE SWALLOWING: 0
ABDOMINAL PAIN: 0
SHORTNESS OF BREATH: 0

## 2020-02-05 ASSESSMENT — PATIENT HEALTH QUESTIONNAIRE - PHQ9
SUM OF ALL RESPONSES TO PHQ QUESTIONS 1-9: 0
1. LITTLE INTEREST OR PLEASURE IN DOING THINGS: 0
2. FEELING DOWN, DEPRESSED OR HOPELESS: 0
SUM OF ALL RESPONSES TO PHQ9 QUESTIONS 1 & 2: 0
SUM OF ALL RESPONSES TO PHQ QUESTIONS 1-9: 0

## 2020-02-05 NOTE — PROGRESS NOTES
[address] PRIMARY CARE  [address] [facility] [address]  Dept: [phone]  Dept Fax: [phone]    Office Progress Note  Date of patient's visit: 2/5/2020  Patient's Name:  Dav Alexander YOB: 1960            HILDA TURNER PA  ================================================================    REASON FOR VISIT/CHIEF COMPLAINT:  6 Month Follow-Up and Hypertension    HISTORY OF PRESENTING ILLNESS:  History was obtained from: patient. Dav Alexander is a 64 y.o. is here for follow up for hypertension, hypercholesterolemia, anxiety and GERD. Patient states that symptoms are well controlled. She has no new concerns or complaints today. She is here for medication refills.       Patient Active Problem List   Diagnosis    Essential hypertension    Pure hypercholesterolemia    Precordial pain    Palpitations       Health Maintenance Due   Topic Date Due    Hepatitis C screen  1960    HIV screen  11/14/1977    Cervical cancer screen  11/14/1983    Colon cancer screen colonoscopy  11/14/2012    Flu vaccine (1) 09/01/2019       Allergies   Allergen Reactions    Sulfamethoxazole-Trimethoprim      Throat swelling          Current Outpatient Medications   Medication Sig Dispense Refill    famciclovir (FAMVIR) 500 MG tablet TAKE ONE TABLET BY MOUTH TWICE A DAY      magnesium oxide (MAG-OX) 400 MG tablet Take 400 mg by mouth daily      citalopram (CELEXA) 40 MG tablet Take 1 tablet by mouth daily 90 tablet 1    albuterol sulfate (PROAIR RESPICLICK) 608 (90 Base) MCG/ACT aerosol powder inhalation Inhale 2 puffs into the lungs every 6 hours as needed for Wheezing or Shortness of Breath 2 Inhaler 1    valsartan (DIOVAN) 160 MG tablet Take 1 tablet by mouth daily 90 tablet 3    atenolol (TENORMIN) 50 MG tablet Take 1 tablet by mouth daily 90 tablet 1    pantoprazole sodium (PROTONIX) 40 MG PACK packet Take 1 packet by

## 2020-02-19 ENCOUNTER — OFFICE VISIT (OUTPATIENT)
Dept: FAMILY MEDICINE CLINIC | Age: 60
End: 2020-02-19
Payer: COMMERCIAL

## 2020-02-19 VITALS
RESPIRATION RATE: 16 BRPM | HEART RATE: 87 BPM | HEIGHT: 68 IN | WEIGHT: 171 LBS | SYSTOLIC BLOOD PRESSURE: 122 MMHG | DIASTOLIC BLOOD PRESSURE: 83 MMHG | BODY MASS INDEX: 25.91 KG/M2 | TEMPERATURE: 98.1 F | OXYGEN SATURATION: 94 %

## 2020-02-19 LAB
INFLUENZA A ANTIBODY: NEGATIVE
INFLUENZA B ANTIBODY: NEGATIVE

## 2020-02-19 PROCEDURE — 96372 THER/PROPH/DIAG INJ SC/IM: CPT | Performed by: PHYSICIAN ASSISTANT

## 2020-02-19 PROCEDURE — 87804 INFLUENZA ASSAY W/OPTIC: CPT | Performed by: PHYSICIAN ASSISTANT

## 2020-02-19 PROCEDURE — 99214 OFFICE O/P EST MOD 30 MIN: CPT | Performed by: PHYSICIAN ASSISTANT

## 2020-02-19 PROCEDURE — 94640 AIRWAY INHALATION TREATMENT: CPT | Performed by: PHYSICIAN ASSISTANT

## 2020-02-19 RX ORDER — METHYLPREDNISOLONE SODIUM SUCCINATE 125 MG/2ML
125 INJECTION, POWDER, LYOPHILIZED, FOR SOLUTION INTRAMUSCULAR; INTRAVENOUS ONCE
Status: COMPLETED | OUTPATIENT
Start: 2020-02-19 | End: 2020-02-19

## 2020-02-19 RX ORDER — AZITHROMYCIN 250 MG/1
250 TABLET, FILM COATED ORAL DAILY
Qty: 6 TABLET | Refills: 0 | Status: SHIPPED | OUTPATIENT
Start: 2020-02-19 | End: 2020-02-24

## 2020-02-19 RX ORDER — GUAIFENESIN AND CODEINE PHOSPHATE 100; 10 MG/5ML; MG/5ML
5 SOLUTION ORAL 4 TIMES DAILY PRN
Qty: 180 ML | Refills: 0 | Status: SHIPPED | OUTPATIENT
Start: 2020-02-19 | End: 2020-02-22

## 2020-02-19 RX ORDER — CEFTRIAXONE 1 G/1
1 INJECTION, POWDER, FOR SOLUTION INTRAMUSCULAR; INTRAVENOUS ONCE
Status: COMPLETED | OUTPATIENT
Start: 2020-02-19 | End: 2020-02-19

## 2020-02-19 RX ORDER — ALBUTEROL SULFATE 2.5 MG/3ML
2.5 SOLUTION RESPIRATORY (INHALATION) ONCE
Status: COMPLETED | OUTPATIENT
Start: 2020-02-19 | End: 2020-02-19

## 2020-02-19 RX ORDER — ALBUTEROL SULFATE 2.5 MG/3ML
2.5 SOLUTION RESPIRATORY (INHALATION) EVERY 4 HOURS PRN
Qty: 1 PACKAGE | Refills: 2 | Status: SHIPPED | OUTPATIENT
Start: 2020-02-19 | End: 2020-12-18

## 2020-02-19 RX ORDER — PREDNISONE 20 MG/1
20 TABLET ORAL 2 TIMES DAILY
Qty: 10 TABLET | Refills: 0 | Status: SHIPPED | OUTPATIENT
Start: 2020-02-19 | End: 2020-02-24

## 2020-02-19 RX ADMIN — CEFTRIAXONE 1 G: 1 INJECTION, POWDER, FOR SOLUTION INTRAMUSCULAR; INTRAVENOUS at 10:18

## 2020-02-19 RX ADMIN — METHYLPREDNISOLONE SODIUM SUCCINATE 125 MG: 125 INJECTION, POWDER, LYOPHILIZED, FOR SOLUTION INTRAMUSCULAR; INTRAVENOUS at 10:19

## 2020-02-19 RX ADMIN — ALBUTEROL SULFATE 2.5 MG: 2.5 SOLUTION RESPIRATORY (INHALATION) at 09:45

## 2020-02-19 ASSESSMENT — ENCOUNTER SYMPTOMS
CHEST TIGHTNESS: 0
EYE ITCHING: 0
NAUSEA: 1
CONSTIPATION: 0
SINUS PRESSURE: 0
SHORTNESS OF BREATH: 1
FACIAL SWELLING: 0
SORE THROAT: 1
SINUS PAIN: 0
VOICE CHANGE: 0
EYE REDNESS: 0
TROUBLE SWALLOWING: 0
COUGH: 1
EYE DISCHARGE: 0
BACK PAIN: 0
DIARRHEA: 0
WHEEZING: 1
ABDOMINAL PAIN: 0
RHINORRHEA: 0
VOMITING: 0

## 2020-02-19 NOTE — PROGRESS NOTES
[address] [employer] [address]  Dept: [phone]  Dept Fax: [phone]    Office Progress Note  Date of patient's visit: 2/19/2020  Patient's Name:  Bridgett Duran YOB: 1960            HILDA JERMAINE JOHNNY GARY  ================================================================    REASON FOR VISIT/CHIEF COMPLAINT:  Cough (with green mucus ); Shortness of Breath; Wheezing; Fatigue; Generalized Body Aches; and Chills    HISTORY OF PRESENTING ILLNESS:  History was obtained from: patient. Bridgett Duran is a 64 y.o. female who presents with c/o fever, chills, body aches, productive cough of green sputum, wheezing and mild shortness of breath for the last 2 days. Patient denies any chest pain, nausea or vomiting or abdominal pain. She has had decreased appetite. She has been able to drink liquids and stay hydrated. Patient Active Problem List   Diagnosis    Essential hypertension    Pure hypercholesterolemia    Precordial pain    Palpitations       Health Maintenance Due   Topic Date Due    Hepatitis C screen  1960    HIV screen  11/14/1977    Cervical cancer screen  11/14/1983    Colon cancer screen colonoscopy  11/14/2012    Flu vaccine (1) 09/01/2019       Allergies   Allergen Reactions    Sulfamethoxazole-Trimethoprim      Throat swelling          Current Outpatient Medications   Medication Sig Dispense Refill    predniSONE (DELTASONE) 20 MG tablet Take 1 tablet by mouth 2 times daily for 5 days 10 tablet 0    azithromycin (ZITHROMAX Z-MARTA) 250 MG tablet Take 1 tablet by mouth daily for 5 days Take 2 tablets by mouth on day 1 then 1 tablet daily by mouth  Daily for 4 days 6 tablet 0    guaiFENesin-codeine (CHERATUSSIN AC) 100-10 MG/5ML syrup Take 5 mLs by mouth 4 times daily as needed for Cough for up to 3 days.  180 mL 0    albuterol (PROVENTIL) (2.5 MG/3ML) 0.083% nebulizer solution change, chills, fatigue and fever. Negative for diaphoresis and unexpected weight change. HENT: Positive for sore throat. Negative for congestion, ear discharge, ear pain, facial swelling, hearing loss, nosebleeds, postnasal drip, rhinorrhea, sinus pressure, sinus pain, sneezing, tinnitus, trouble swallowing and voice change. Eyes: Negative for discharge, redness and itching. Respiratory: Positive for cough, shortness of breath and wheezing. Negative for chest tightness. Cardiovascular: Negative for chest pain and palpitations. Gastrointestinal: Positive for nausea. Negative for abdominal pain, constipation, diarrhea and vomiting. Genitourinary: Negative for decreased urine volume. Musculoskeletal: Negative for back pain, neck pain and neck stiffness. Neurological: Negative for dizziness, syncope, weakness, light-headedness and headaches. Physical Exam  Vitals signs and nursing note reviewed. Constitutional:       General: She is not in acute distress. Appearance: Normal appearance. She is well-developed. She is ill-appearing and diaphoretic. She is not toxic-appearing. HENT:      Head: Normocephalic and atraumatic. Right Ear: Hearing, tympanic membrane, ear canal and external ear normal.      Left Ear: Hearing, tympanic membrane, ear canal and external ear normal.      Nose:      Right Sinus: No maxillary sinus tenderness or frontal sinus tenderness. Left Sinus: No maxillary sinus tenderness or frontal sinus tenderness. Mouth/Throat:      Lips: Pink. Mouth: Mucous membranes are moist.      Pharynx: Oropharynx is clear. Uvula midline. No pharyngeal swelling, oropharyngeal exudate, posterior oropharyngeal erythema or uvula swelling. Tonsils: No tonsillar exudate or tonsillar abscesses. Eyes:      General: No scleral icterus. Right eye: No discharge. Left eye: No discharge.       Conjunctiva/sclera: Conjunctivae normal.   Neck: Musculoskeletal: Normal range of motion and neck supple. Thyroid: No thyromegaly. Cardiovascular:      Rate and Rhythm: Normal rate and regular rhythm. Heart sounds: Normal heart sounds. No murmur. No friction rub. No gallop. Pulmonary:      Effort: Pulmonary effort is normal. No respiratory distress. Breath sounds: No stridor. Wheezing and rhonchi present. No decreased breath sounds or rales. Chest:      Chest wall: No tenderness. Abdominal:      Palpations: Abdomen is soft. Tenderness: There is no abdominal tenderness. Musculoskeletal: Normal range of motion. Lymphadenopathy:      Cervical: No cervical adenopathy. Skin:     General: Skin is warm. Neurological:      General: No focal deficit present. Mental Status: She is alert and oriented to person, place, and time. Psychiatric:         Attention and Perception: Attention and perception normal.         Mood and Affect: Mood normal.         Speech: Speech normal.         Behavior: Behavior normal. Behavior is cooperative. Thought Content:  Thought content normal.         Judgment: Judgment normal.         Vitals:    02/19/20 0812   BP: 122/83   Site: Right Upper Arm   Position: Sitting   Cuff Size: Medium Adult   Pulse: 87   Resp: 16   Temp: 98.1 °F (36.7 °C)   TempSrc: Oral   SpO2: 94%   Weight: 171 lb (77.6 kg)   Height: 5' 7.99\" (1.727 m)     BP Readings from Last 3 Encounters:   02/19/20 122/83   02/05/20 125/76   11/27/19 (!) 139/90              DIAGNOSTIC FINDINGS:  CBC:  Lab Results   Component Value Date    WBC 5.0 10/01/2013    HGB 13.8 10/01/2013     10/01/2013       BMP:    Lab Results   Component Value Date     07/26/2019    K 4.3 07/26/2019     07/26/2019    CO2 24 07/26/2019    BUN 12 07/26/2019    CREATININE 0.93 07/26/2019    GLUCOSE 104 07/26/2019         FASTING LIPID PANEL:  Lab Results   Component Value Date    CHOL 235 (H) 07/26/2019    HDL 40 (L) 07/26/2019    TRIG 161 (H) 07/26/2019       No results found for this visit on 02/19/20. Patient appears ill but in no apparent distress and nontoxic. Vital signs are within normal limits. She is speaking in full sentences and no retractions. Influenza negative. Diffuse wheezing and rhonchi noted throughout all lung fields. Rocephin 1 g IM, Solu-Medrol 125 mg IM and albuterol given in the office. Patient states easier to take a deep breath. Wheezing has resolved. Patient is discharged home in stable condition and instructed to follow-up in 1 to 2 days if symptoms worsen or do not improve. ASSESSMENT AND PLAN:   Diagnosis Orders   1. Acute bronchitis, unspecified organism  cefTRIAXone (ROCEPHIN) injection 1 g    methylPREDNISolone sodium (SOLU-MEDROL) injection 125 mg    albuterol (PROVENTIL) nebulizer solution 2.5 mg    predniSONE (DELTASONE) 20 MG tablet    azithromycin (ZITHROMAX Z-MARTA) 250 MG tablet    guaiFENesin-codeine (CHERATUSSIN AC) 100-10 MG/5ML syrup    albuterol (PROVENTIL) (2.5 MG/3ML) 0.083% nebulizer solution    DME Order for Nebulizer as OP   2. Flu-like symptoms  POCT Influenza A/B    cefTRIAXone (ROCEPHIN) injection 1 g    methylPREDNISolone sodium (SOLU-MEDROL) injection 125 mg    albuterol (PROVENTIL) nebulizer solution 2.5 mg    predniSONE (DELTASONE) 20 MG tablet    azithromycin (ZITHROMAX Z-MARTA) 250 MG tablet    guaiFENesin-codeine (CHERATUSSIN AC) 100-10 MG/5ML syrup    albuterol (PROVENTIL) (2.5 MG/3ML) 0.083% nebulizer solution   3. Colon cancer screening  Cologuard       FOLLOW UP AND INSTRUCTIONS:  Return in about 3 months (around 5/19/2020), or if symptoms worsen or fail to improve. · Discussed use, benefit, and side effects of prescribed medications. Barriers to medication compliance addressed. All patient questions answered. Pt voiced understanding.      · Patient instructed to return to the office if symptoms do not resolve or go directly to the ER if the symptoms worsen - patient voiced understanding. · Patient given educational materials - see patient instructions    Ward Proc. Sal Prakash 1  [facility]  2/19/2020, 9:11 AM    This note is created with the assistance of a speech-recognition program. While intending to generate a document that actually reflects the content of the visit, the document can still have some mistakes which may not have been identified and corrected by editing.

## 2020-03-13 ENCOUNTER — TELEPHONE (OUTPATIENT)
Dept: FAMILY MEDICINE CLINIC | Age: 60
End: 2020-03-13

## 2020-03-25 ENCOUNTER — TELEPHONE (OUTPATIENT)
Dept: FAMILY MEDICINE CLINIC | Age: 60
End: 2020-03-25

## 2020-03-25 ENCOUNTER — NURSE TRIAGE (OUTPATIENT)
Dept: OTHER | Facility: CLINIC | Age: 60
End: 2020-03-25

## 2020-03-25 NOTE — TELEPHONE ENCOUNTER
Reason for Disposition   Diabetes mellitus or weak immune system (e.g., HIV positive, cancer chemo, splenectomy, organ transplant, chronic steroids)    Answer Assessment - Initial Assessment Questions  1. ONSET: \"When did the throat start hurting? \" (Hours or days ago)       Sore throat started about 4 days ago  2. SEVERITY: \"How bad is the sore throat? \" (Scale 1-10; mild, moderate or severe)    - MILD (1-3):  doesn't interfere with eating or normal activities    - MODERATE (4-7): interferes with eating some solids and normal activities    - SEVERE (8-10):  excruciating pain, interferes with most normal activities    - SEVERE DYSPHAGIA: can't swallow liquids, drooling      Pain is 7/10, hurts to swallow but still able to swallow  3. STREP EXPOSURE: \"Has there been any exposure to strep within the past week? \" If so, ask: \"What type of contact occurred? \"      Unsure of exposure  4. VIRAL SYMPTOMS: Marylee Ponto there any symptoms of a cold, such as a runny nose, cough, hoarse voice or red eyes? \"     Cough, ear pain  5. FEVER: \"Do you have a fever? \" If so, ask: \"What is your temperature, how was it measured, and when did it start? \"   no fever  6. PUS ON THE TONSILS: \"Is there pus on the tonsils in the back of your throat? \"     No pus.  7. OTHER SYMPTOMS: \"Do you have any other symptoms? \" (e.g., difficulty breathing, headache, rash)       8. PREGNANCY: \"Is there any chance you are pregnant? \" \"When was your last menstrual period? \"   na    Protocols used: SORE THROAT-ADULT-OH    Received call from [facility]. Pt calling c/o sore throat and ear pain for the past 4 days. Hurts to swallow but still able to swallow. Recommend pt be seen at flu clinic or virtual visit. Increase fluids, tylenol as needed, gargle with warm salt water, call back as needed. Please do not respond to the triage nurse through this encounter. Any subsequent communication should be directly with the patient.

## 2020-03-26 RX ORDER — ASPIRIN 325 MG
325 TABLET ORAL DAILY
COMMUNITY

## 2020-03-27 ENCOUNTER — TELEMEDICINE (OUTPATIENT)
Dept: FAMILY MEDICINE CLINIC | Age: 60
End: 2020-03-27
Payer: COMMERCIAL

## 2020-03-27 PROBLEM — J01.90 ACUTE BACTERIAL SINUSITIS: Status: ACTIVE | Noted: 2020-03-27

## 2020-03-27 PROBLEM — B96.89 ACUTE BACTERIAL SINUSITIS: Status: ACTIVE | Noted: 2020-03-27

## 2020-03-27 PROBLEM — I63.9: Status: ACTIVE | Noted: 2020-03-27

## 2020-03-27 PROCEDURE — 99214 OFFICE O/P EST MOD 30 MIN: CPT | Performed by: PHYSICIAN ASSISTANT

## 2020-03-27 RX ORDER — PREDNISONE 20 MG/1
20 TABLET ORAL 2 TIMES DAILY
Qty: 6 TABLET | Refills: 0 | Status: SHIPPED | OUTPATIENT
Start: 2020-03-27 | End: 2020-03-30

## 2020-03-27 RX ORDER — AMOXICILLIN AND CLAVULANATE POTASSIUM 875; 125 MG/1; MG/1
1 TABLET, FILM COATED ORAL 2 TIMES DAILY
Qty: 20 TABLET | Refills: 0 | Status: SHIPPED | OUTPATIENT
Start: 2020-03-27 | End: 2020-04-06

## 2020-03-27 ASSESSMENT — ENCOUNTER SYMPTOMS
VOICE CHANGE: 0
SORE THROAT: 1
FACIAL SWELLING: 0
EYE ITCHING: 0
EYE DISCHARGE: 0
CHEST TIGHTNESS: 0
CONSTIPATION: 0
NAUSEA: 0
ABDOMINAL PAIN: 0
BACK PAIN: 0
RHINORRHEA: 1
VOMITING: 0
COUGH: 1
WHEEZING: 0
SHORTNESS OF BREATH: 0
SINUS PRESSURE: 1
SINUS PAIN: 0
TROUBLE SWALLOWING: 0
EYE REDNESS: 0
DIARRHEA: 0

## 2020-03-27 NOTE — PROGRESS NOTES
[address] PRIMARY CARE  [address] [place] [address]  Dept: [phone]  Dept Fax: [phone]    Office Progress Note  Date of patient's visit: 3/27/2020  Patient's Name:  Nurys Friend YOB: 1960            HILDA GARY  ================================================================    REASON FOR VISIT/CHIEF COMPLAINT:  Pharyngitis (onset the 19th ); Ear Fullness; and Cough    HISTORY OF PRESENTING ILLNESS:  History was obtained from: patient. Nurys Friend is a 64 y.o. female who attempted a video visit and was unable to connect so we did the visit via telephone call. 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 Patient has had a full recovery except for mild difficulty \"finding my words sometimes. \"  Patient is now taking 325 mg aspirin daily. Patient complains of sinus pressure, green rhinorrhea, ear pressure without drainage or hearing loss, and intermittent productive cough of green sputum without chest pain, shortness of breath, nausea or vomiting since being in the hospital.  Patient did have bronchitis prior to her admission to the hospital which symptoms resolved. Patient states that she has had a mild sore throat but no difficulty breathing or swallowing. She has been able to eat and drink and stay hydrated. Patient denies any fevers.       Patient Active Problem List   Diagnosis    Essential hypertension    Pure hypercholesterolemia    Precordial pain    Palpitations    Acute bacterial sinusitis    Cerebrovascular accident without paresis [facility])       Health Maintenance Due   Topic Date Due    Hepatitis C screen  1960    HIV screen  11/14/1977    Cervical cancer screen  11/14/1983    Colon cancer screen colonoscopy  11/14/2012    Flu vaccine (1) 09/01/2019       Allergies   Allergen Reactions    Sulfamethoxazole-Trimethoprim 20 MG tablet   2. Cerebrovascular accident without paresis ([facility] [address]  Cont ASA, will review records from [facility]:  Return if symptoms worsen or fail to improve. · Discussed use, benefit, and side effects of prescribed medications. Barriers to medication compliance addressed. All patient questions answered. Pt voiced understanding. · Patient instructed to return to the office if symptoms do not resolve or go directly to the ER if the symptoms worsen - patient voiced understanding. · Patient given educational materials - see patient instructions    Sheng Estevez [facility]  3/27/2020, 9:22 AM    This note is created with the assistance of a speech-recognition program. While intending to generate a document that actually reflects the content of the visit, the document can still have some mistakes which may not have been identified and corrected by editing.

## 2020-04-09 ENCOUNTER — TELEMEDICINE (OUTPATIENT)
Dept: FAMILY MEDICINE CLINIC | Age: 60
End: 2020-04-09
Payer: COMMERCIAL

## 2020-04-09 PROBLEM — H93.8X1 EAR PRESSURE, RIGHT: Status: ACTIVE | Noted: 2020-04-09

## 2020-04-09 PROCEDURE — 99212 OFFICE O/P EST SF 10 MIN: CPT | Performed by: PHYSICIAN ASSISTANT

## 2020-04-09 ASSESSMENT — ENCOUNTER SYMPTOMS
FACIAL SWELLING: 0
COLOR CHANGE: 0
VOICE CHANGE: 0
SINUS PRESSURE: 0
WHEEZING: 0
EYE REDNESS: 0
COUGH: 0
RHINORRHEA: 0
BACK PAIN: 0
SINUS PAIN: 0
ABDOMINAL DISTENTION: 0
NAUSEA: 0
TROUBLE SWALLOWING: 0
ABDOMINAL PAIN: 0
SHORTNESS OF BREATH: 0
CHEST TIGHTNESS: 0
DIARRHEA: 0
CONSTIPATION: 0
BLOOD IN STOOL: 0
PHOTOPHOBIA: 0
SORE THROAT: 0
VOMITING: 0

## 2020-08-24 RX ORDER — ATENOLOL 50 MG/1
TABLET ORAL
Qty: 30 TABLET | Refills: 0 | Status: SHIPPED | OUTPATIENT
Start: 2020-08-24 | End: 2020-09-23

## 2020-08-24 RX ORDER — CITALOPRAM 40 MG/1
TABLET ORAL
Qty: 30 TABLET | Refills: 0 | Status: SHIPPED | OUTPATIENT
Start: 2020-08-24 | End: 2020-09-23

## 2020-08-24 NOTE — TELEPHONE ENCOUNTER
Next Visit Date:  No future appointments.     Health Maintenance   Topic Date Due    Hepatitis C screen  1960    HIV screen  11/14/1977    Cervical cancer screen  11/14/1983    Shingles Vaccine (1 of 2) 11/14/2012    Colon cancer screen colonoscopy  11/14/2012    A1C test (Diabetic or Prediabetic)  07/26/2020    Potassium monitoring  07/26/2020    Creatinine monitoring  07/26/2020    Flu vaccine (1) 09/01/2020    Lipid screen  10/26/2020    Breast cancer screen  04/07/2021    DTaP/Tdap/Td vaccine (3 - Td) 08/01/2026    Hepatitis A vaccine  Aged Out    Hepatitis B vaccine  Aged Out    Hib vaccine  Aged Out    Meningococcal (ACWY) vaccine  Aged Out    Pneumococcal 0-64 years Vaccine  Aged Out       Hemoglobin A1C (%)   Date Value   07/26/2019 5.9             ( goal A1C is < 7)   No results found for: LABMICR  LDL Cholesterol (mg/dL)   Date Value   07/26/2019 163 (H)       (goal LDL is <100)   AST (U/L)   Date Value   07/26/2019 21     ALT (U/L)   Date Value   07/26/2019 21     BUN (mg/dL)   Date Value   07/26/2019 12     BP Readings from Last 3 Encounters:   02/19/20 122/83   02/05/20 125/76   11/27/19 (!) 139/90          (goal 120/80)    All Future Testing planned in [facility]  Lab Frequency Next Occurrence   Cologuard Once 02/19/2020               Patient Active Problem List:     Essential hypertension     Pure hypercholesterolemia     Precordial pain     Palpitations     Acute bacterial sinusitis     Cerebrovascular accident without paresis (HCC)     Ear pressure, right

## 2020-09-23 RX ORDER — ATENOLOL 50 MG/1
TABLET ORAL
Qty: 30 TABLET | Refills: 0 | Status: SHIPPED | OUTPATIENT
Start: 2020-09-23 | End: 2020-10-24

## 2020-09-23 RX ORDER — CITALOPRAM 40 MG/1
TABLET ORAL
Qty: 30 TABLET | Refills: 0 | Status: SHIPPED | OUTPATIENT
Start: 2020-09-23 | End: 2020-10-24

## 2020-10-24 RX ORDER — ATENOLOL 50 MG/1
TABLET ORAL
Qty: 30 TABLET | Refills: 0 | Status: SHIPPED | OUTPATIENT
Start: 2020-10-24 | End: 2020-11-02

## 2020-10-24 RX ORDER — CITALOPRAM 40 MG/1
TABLET ORAL
Qty: 30 TABLET | Refills: 0 | Status: SHIPPED | OUTPATIENT
Start: 2020-10-24 | End: 2020-11-02

## 2020-11-02 RX ORDER — ATENOLOL 50 MG/1
TABLET ORAL
Qty: 30 TABLET | Refills: 0 | Status: SHIPPED | OUTPATIENT
Start: 2020-11-02 | End: 2020-12-28

## 2020-11-02 RX ORDER — CITALOPRAM 40 MG/1
TABLET ORAL
Qty: 30 TABLET | Refills: 0 | Status: SHIPPED | OUTPATIENT
Start: 2020-11-02 | End: 2020-12-28

## 2020-11-02 NOTE — TELEPHONE ENCOUNTER
Electronic medication refill request. Pharmacy on file. Please advise. Next Visit Date:  No future appointments.     Health Maintenance   Topic Date Due    Hepatitis C screen  1960    HIV screen  11/14/1977    Cervical cancer screen  11/14/1983    Shingles Vaccine (1 of 2) 11/14/2012    Colon cancer screen colonoscopy  11/14/2012    A1C test (Diabetic or Prediabetic)  07/26/2020    Potassium monitoring  07/26/2020    Creatinine monitoring  07/26/2020    Flu vaccine (1) 09/01/2020    Lipid screen  10/26/2020    Breast cancer screen  04/07/2021    DTaP/Tdap/Td vaccine (3 - Td) 08/01/2026    Hepatitis A vaccine  Aged Out    Hepatitis B vaccine  Aged Out    Hib vaccine  Aged Out    Meningococcal (ACWY) vaccine  Aged Out    Pneumococcal 0-64 years Vaccine  Aged Out       Hemoglobin A1C (%)   Date Value   07/26/2019 5.9             ( goal A1C is < 7)   No results found for: LABMICR  LDL Cholesterol (mg/dL)   Date Value   07/26/2019 163 (H)       (goal LDL is <100)   AST (U/L)   Date Value   07/26/2019 21     ALT (U/L)   Date Value   07/26/2019 21     BUN (mg/dL)   Date Value   07/26/2019 12     BP Readings from Last 3 Encounters:   02/19/20 122/83   02/05/20 125/76   11/27/19 (!) 139/90          (goal 120/80)    All Future Testing planned in [facility]  Lab Frequency Next Occurrence   Cologuard Once 02/19/2020               Patient Active Problem List:     Essential hypertension     Pure hypercholesterolemia     Precordial pain     Palpitations     Acute bacterial sinusitis     Cerebrovascular accident without paresis (HCC)     Ear pressure, right

## 2020-11-20 ENCOUNTER — TELEPHONE (OUTPATIENT)
Dept: FAMILY MEDICINE CLINIC | Age: 60
End: 2020-11-20

## 2020-12-18 ENCOUNTER — OFFICE VISIT (OUTPATIENT)
Dept: FAMILY MEDICINE CLINIC | Age: 60
End: 2020-12-18
Payer: COMMERCIAL

## 2020-12-18 VITALS
HEIGHT: 68 IN | BODY MASS INDEX: 27.28 KG/M2 | WEIGHT: 180 LBS | RESPIRATION RATE: 14 BRPM | OXYGEN SATURATION: 99 % | SYSTOLIC BLOOD PRESSURE: 138 MMHG | DIASTOLIC BLOOD PRESSURE: 88 MMHG | HEART RATE: 65 BPM | TEMPERATURE: 97 F

## 2020-12-18 PROBLEM — E72.12 MTHFR (METHYLENE THF REDUCTASE) DEFICIENCY AND HOMOCYSTINURIA (HCC): Status: ACTIVE | Noted: 2020-12-18

## 2020-12-18 PROBLEM — G47.33 OBSTRUCTIVE SLEEP APNEA SYNDROME: Status: ACTIVE | Noted: 2017-02-20

## 2020-12-18 PROBLEM — K21.9 GASTROESOPHAGEAL REFLUX DISEASE: Status: ACTIVE | Noted: 2020-12-18

## 2020-12-18 PROBLEM — R40.0 DAYTIME SOMNOLENCE: Status: ACTIVE | Noted: 2020-12-18

## 2020-12-18 PROBLEM — Z87.09 HX OF NASAL POLYP: Status: ACTIVE | Noted: 2018-01-29

## 2020-12-18 PROBLEM — R51.9 HEADACHE: Status: ACTIVE | Noted: 2020-12-18

## 2020-12-18 PROBLEM — G25.81 RESTLESS LEGS SYNDROME: Status: ACTIVE | Noted: 2020-12-18

## 2020-12-18 PROBLEM — S42.024A CLOSED NONDISPLACED FRACTURE OF SHAFT OF RIGHT CLAVICLE: Status: ACTIVE | Noted: 2017-04-03

## 2020-12-18 PROBLEM — E72.11 MTHFR (METHYLENE THF REDUCTASE) DEFICIENCY AND HOMOCYSTINURIA (HCC): Status: ACTIVE | Noted: 2020-12-18

## 2020-12-18 PROBLEM — I15.9 SECONDARY HYPERTENSION: Status: ACTIVE | Noted: 2020-12-18

## 2020-12-18 PROBLEM — K21.00 GASTROESOPHAGEAL REFLUX DISEASE WITH ESOPHAGITIS: Status: ACTIVE | Noted: 2018-10-26

## 2020-12-18 PROBLEM — G47.34 NOCTURNAL HYPOXEMIA: Status: ACTIVE | Noted: 2017-02-20

## 2020-12-18 PROBLEM — I63.9 CEREBRAL INFARCTION (HCC): Status: ACTIVE | Noted: 2020-12-18

## 2020-12-18 PROBLEM — J30.1 SEASONAL ALLERGIC RHINITIS DUE TO POLLEN: Status: ACTIVE | Noted: 2017-06-07

## 2020-12-18 PROBLEM — F32.9 MAJOR DEPRESSION, SINGLE EPISODE: Status: ACTIVE | Noted: 2020-12-18

## 2020-12-18 PROBLEM — R13.13 PHARYNGEAL DYSPHAGIA: Status: ACTIVE | Noted: 2018-10-26

## 2020-12-18 PROCEDURE — 99214 OFFICE O/P EST MOD 30 MIN: CPT | Performed by: PHYSICIAN ASSISTANT

## 2020-12-18 RX ORDER — FOLIC ACID 1 MG/1
1 TABLET ORAL DAILY
COMMUNITY
End: 2021-04-01 | Stop reason: SDUPTHER

## 2020-12-18 RX ORDER — LANOLIN ALCOHOL/MO/W.PET/CERES
50 CREAM (GRAM) TOPICAL DAILY
COMMUNITY

## 2020-12-18 ASSESSMENT — ENCOUNTER SYMPTOMS
DIARRHEA: 0
WHEEZING: 0
RECTAL PAIN: 0
ABDOMINAL DISTENTION: 0
BLOOD IN STOOL: 0
SHORTNESS OF BREATH: 0
CONSTIPATION: 0
BACK PAIN: 0
CHEST TIGHTNESS: 0
NAUSEA: 0
COUGH: 0
VOMITING: 0
ABDOMINAL PAIN: 0
ANAL BLEEDING: 0

## 2020-12-18 NOTE — PROGRESS NOTES
[address] PRIMARY CARE  [address] [place] [address] [facility]  Dept: [phone]  Dept Fax: [phone]    Office Progress Note  Date of patient's visit: 12/18/2020  Patient's Name:  Flaca Al YOB: 1960            HILDA TURNER PA  ================================================================    REASON FOR VISIT/CHIEF COMPLAINT:  Hypertension, Health Maintenance (Patient refused flu vaccine ), and Discuss Labs (Patient wants COVID antibody test )    HISTORY OF PRESENTING ILLNESS:  History was obtained from: patient. Flaca Al is a 64 y.o. is here for follow up for hypertension, hypercholesterolemia. Blood pressure is well controlled with medications. She states that she has had recent diagnosis of MTHFR and started on aspirin and vitamin B12. Patient denies any abnormal bleeding. Patient states that she has fully recovered from her CVA in March of this past year. She is no longer having any residual side effects on the right side. Patient states that she has been under a lot of stress recently due to new diagnosis of a noncancerous brain tumor and her daughter. Anxiety and depression well controlled with Celexa. Affect is good. She denies any suicidal or homicidal ideations.       Patient Active Problem List   Diagnosis    Essential hypertension    Pure hypercholesterolemia    Precordial pain    Palpitations    Acute bacterial sinusitis    Cerebrovascular accident without paresis ([address]    Ear pressure, right    Adiposity    Anxiety    Closed nondisplaced fracture of shaft of right clavicle    Daytime somnolence    Obstructive sleep apnea syndrome    Nocturnal hypoxemia    Major depression, single episode    Hx of nasal polyp    Hiatal hernia    Headache    Gastroesophageal reflux disease with esophagitis    Gastroesophageal reflux disease    Flexural eczema    Health maintenance examination  Seasonal allergic rhinitis due to pollen    Restless legs syndrome    Psoriasis    Pharyngeal dysphagia    Hypercholesterolemia    Secondary hypertension    [facility]    MTHFR (methylene THF reductase) deficiency and homocystinuria ([facility]       Health Maintenance Due   Topic Date Due    Shingles Vaccine (1 of 2) 11/14/2012    Colon cancer screen colonoscopy  11/14/2012    A1C test (Diabetic or Prediabetic)  07/26/2020    Potassium monitoring  07/26/2020    Creatinine monitoring  07/26/2020    Lipid screen  10/26/2020       Allergies   Allergen Reactions    Sulfamethoxazole-Trimethoprim      Throat swelling     Venlafaxine      Other reaction(s): euphoria         Current Outpatient Medications   Medication Sig Dispense Refill    vitamin B-6 (PYRIDOXINE) 50 MG tablet Take 50 mg by mouth daily      Cyanocobalamin (VITAMIN B 12 PO) Take by mouth      folic acid (FOLVITE) 1 MG tablet Take 1 mg by mouth daily      citalopram (CELEXA) 40 MG tablet TAKE ONE TABLET BY MOUTH DAILY 30 tablet 0    atenolol (TENORMIN) 50 MG tablet TAKE ONE TABLET BY MOUTH DAILY 30 tablet 0    aspirin 325 MG tablet Take 325 mg by mouth daily      famciclovir (FAMVIR) 500 MG tablet TAKE ONE TABLET BY MOUTH TWICE A DAY      magnesium oxide (MAG-OX) 400 MG tablet Take 400 mg by mouth daily      valsartan (DIOVAN) 160 MG tablet Take 1 tablet by mouth daily 90 tablet 3    pantoprazole sodium (PROTONIX) 40 MG PACK packet Take 1 packet by mouth 2 times daily (before meals) 60 each 1    atorvastatin (LIPITOR) 80 MG tablet Take 1 tablet by mouth daily 90 tablet 3    fluticasone (FLONASE) 50 MCG/ACT nasal spray 1 spray by Nasal route daily      albuterol (PROVENTIL) (2.5 MG/3ML) 0.083% nebulizer solution Take 3 mLs by nebulization every 4 hours as needed for Wheezing or Shortness of Breath (Patient not taking: Reported on 12/18/2020) 1 Package 2  albuterol sulfate (PROAIR RESPICLICK) 818 (90 Base) MCG/ACT aerosol powder inhalation Inhale 2 puffs into the lungs every 6 hours as needed for Wheezing or Shortness of Breath (Patient not taking: Reported on 12/18/2020) 2 Inhaler 1     No current facility-administered medications for this visit. Social History     Tobacco Use    Smoking status: Never Smoker    Smokeless tobacco: Never Used   Substance Use Topics    Alcohol use: Yes     Comment: 1 per week    Drug use: No       Family History   Problem Relation Age of Onset    Cancer Mother         cervical ca    Other Mother         PE    Diabetes Father     High Blood Pressure Father     High Cholesterol Father     Heart Disease Father         PPM and AFib     Other Sister         brain aneurysm    Heart Disease Maternal Uncle         Review of Systems   Constitutional: Negative for appetite change, chills, diaphoresis, fatigue, fever and unexpected weight change. Respiratory: Negative for cough, chest tightness, shortness of breath and wheezing. Cardiovascular: Negative for chest pain, palpitations and leg swelling. Gastrointestinal: Negative for abdominal distention, abdominal pain, anal bleeding, blood in stool, constipation, diarrhea, nausea, rectal pain and vomiting. Genitourinary: Negative for dysuria, frequency and urgency. Musculoskeletal: Negative for arthralgias, back pain, gait problem, joint swelling, myalgias, neck pain and neck stiffness. Skin: Negative. Neurological: Negative for dizziness, syncope, weakness, light-headedness, numbness and headaches. Psychiatric/Behavioral: Positive for dysphoric mood. Negative for agitation, behavioral problems, confusion, decreased concentration, hallucinations, self-injury, sleep disturbance and suicidal ideas. The patient is nervous/anxious. The patient is not hyperactive. Physical Exam  Vitals signs and nursing note reviewed.    Constitutional: General: She is not in acute distress. Appearance: Normal appearance. She is well-developed and well-groomed. She is not ill-appearing, toxic-appearing or diaphoretic. HENT:      Head: Normocephalic and atraumatic. Right Ear: Tympanic membrane, ear canal and external ear normal.      Left Ear: Tympanic membrane, ear canal and external ear normal.      Nose: Nose normal.      Mouth/Throat:      Lips: Pink. Mouth: Mucous membranes are moist.      Pharynx: Oropharynx is clear. Uvula midline. No oropharyngeal exudate or posterior oropharyngeal erythema. Tonsils: No tonsillar exudate or tonsillar abscesses. Eyes:      General: Lids are normal. No scleral icterus. Right eye: No discharge. Left eye: No discharge. Extraocular Movements: Extraocular movements intact. Conjunctiva/sclera: Conjunctivae normal.      Pupils: Pupils are equal, round, and reactive to light. Neck:      Musculoskeletal: Full passive range of motion without pain, normal range of motion and neck supple. Thyroid: No thyroid mass, thyromegaly or thyroid tenderness. Vascular: No JVD. Trachea: No tracheal deviation. Cardiovascular:      Rate and Rhythm: Normal rate and regular rhythm. Heart sounds: Normal heart sounds, S1 normal and S2 normal. No murmur. No friction rub. No gallop. Pulmonary:      Effort: Pulmonary effort is normal. No respiratory distress. Breath sounds: Normal breath sounds and air entry. No stridor, decreased air movement or transmitted upper airway sounds. No decreased breath sounds, wheezing, rhonchi or rales. Chest:      Chest wall: No tenderness. Abdominal:      General: Abdomen is flat. Bowel sounds are normal. There is no distension. Palpations: Abdomen is soft. There is no mass. Tenderness: There is no abdominal tenderness. There is no right CVA tenderness, left CVA tenderness, guarding or rebound. Musculoskeletal: Normal range of motion. General: No tenderness. Lymphadenopathy:      Head:      Right side of head: No submental, submandibular, tonsillar, preauricular, posterior auricular or occipital adenopathy. Left side of head: No submental, submandibular, tonsillar, preauricular or posterior auricular adenopathy. Cervical: No cervical adenopathy. Right cervical: No superficial, deep or posterior cervical adenopathy. Left cervical: No superficial, deep or posterior cervical adenopathy. Upper Body:      Right upper body: No supraclavicular adenopathy. Left upper body: No supraclavicular adenopathy. Skin:     General: Skin is warm and dry. Coloration: Skin is not jaundiced or pale. Findings: No bruising, erythema, lesion or rash. Neurological:      General: No focal deficit present. Mental Status: She is alert and oriented to person, place, and time. Cranial Nerves: Cranial nerves are intact. No cranial nerve deficit. Sensory: Sensation is intact. No sensory deficit. Motor: Motor function is intact. No weakness. Coordination: Coordination is intact. Coordination normal.      Gait: Gait is intact. Gait normal.      Deep Tendon Reflexes: Reflexes are normal and symmetric. Reflexes normal.   Psychiatric:         Attention and Perception: Attention and perception normal.         Mood and Affect: Mood and affect normal.         Speech: Speech normal.         Behavior: Behavior normal. Behavior is cooperative. Thought Content:  Thought content normal.         Cognition and Memory: Cognition and memory normal.         Judgment: Judgment normal.           Vitals:    12/18/20 1057   BP: 138/88   Pulse: 65   Resp: 14   Temp: 97 °F (36.1 °C)   TempSrc: Temporal   SpO2: 99%   Weight: 180 lb (81.6 kg)   Height: 5' 8\" (1.727 m)     BP Readings from Last 3 Encounters:   12/18/20 138/88   02/19/20 122/83   02/05/20 125/76 DIAGNOSTIC FINDINGS:  CBC:  Lab Results   Component Value Date    WBC 5.0 10/01/2013    HGB 13.8 10/01/2013     10/01/2013       BMP:    Lab Results   Component Value Date     07/26/2019    K 4.3 07/26/2019     07/26/2019    CO2 24 07/26/2019    BUN 12 07/26/2019    CREATININE 0.93 07/26/2019    GLUCOSE 104 07/26/2019         FASTING LIPID PANEL:  Lab Results   Component Value Date    CHOL 235 (H) 07/26/2019    HDL 40 (L) 07/26/2019    TRIG 161 (H) 07/26/2019       No results found for this visit on 12/18/20. ASSESSMENT AND PLAN:   Diagnosis Orders   1. Essential hypertension  Comprehensive Metabolic Panel   2. Screening for colon cancer     3. Screening for hyperlipidemia  Lipid, Fasting   4. Need for prophylactic vaccination and inoculation against varicella  DISCONTINUED: zoster recombinant adjuvanted vaccine (SHINGRIX) 50 MCG/0.5ML SUSR injection   5. Screening mammogram, encounter for     6. Thyroid disorder screen  TSH With Reflex Ft4   7. Screening for diabetes mellitus (DM)  Hemoglobin A1C   8. Encounter for vitamin deficiency screening  Vitamin D 25 Hydroxy   9. MTHFR (methylene THF reductase) deficiency and homocystinuria (HCC)         FOLLOW UP AND INSTRUCTIONS:  · No follow-ups on file. · Discussed use, benefit, and side effects of prescribed medications. Barriers to medication compliance addressed. All patient questions answered. Pt voiced understanding. · Patient instructed to return to the office if symptoms do not resolve or go directly to the ER if the symptoms worsen - patient voiced understanding.     · Patient given educational materials - see patient instructions    Ward Proc. Sal Prakash [address] [place] [address]  12/18/2020, 11:31 AM This note is created with the assistance of a speech-recognition program. While intending to generate a document that actually reflects the content of the visit, the document can still have some mistakes which may not have been identified and corrected by editing.

## 2020-12-28 RX ORDER — ATENOLOL 50 MG/1
TABLET ORAL
Qty: 30 TABLET | Refills: 0 | Status: SHIPPED | OUTPATIENT
Start: 2020-12-28 | End: 2021-01-25

## 2020-12-28 RX ORDER — CITALOPRAM 40 MG/1
TABLET ORAL
Qty: 30 TABLET | Refills: 0 | Status: SHIPPED | OUTPATIENT
Start: 2020-12-28 | End: 2021-01-25

## 2020-12-28 NOTE — TELEPHONE ENCOUNTER
Electronic medication refill request. Pharmacy on file. Please advise. Next Visit Date:  No future appointments.     Health Maintenance   Topic Date Due    Shingles Vaccine (1 of 2) 11/14/2012    Colon cancer screen colonoscopy  11/14/2012    A1C test (Diabetic or Prediabetic)  07/26/2020    Potassium monitoring  07/26/2020    Creatinine monitoring  07/26/2020    Lipid screen  10/26/2020    Cervical cancer screen  12/18/2021 (Originally 11/14/1983)    Flu vaccine (1) 12/18/2021 (Originally 9/1/2020)    Hepatitis C screen  12/18/2021 (Originally 1960)    HIV screen  12/18/2021 (Originally 11/14/1977)    Breast cancer screen  04/07/2021    DTaP/Tdap/Td vaccine (3 - Td) 08/01/2026    Hepatitis A vaccine  Aged Out    Hepatitis B vaccine  Aged Out    Hib vaccine  Aged Out    Meningococcal (ACWY) vaccine  Aged Out    Pneumococcal 0-64 years Vaccine  Aged Out       Hemoglobin A1C (%)   Date Value   07/26/2019 5.9             ( goal A1C is < 7)   No results found for: LABMICR  LDL Cholesterol (mg/dL)   Date Value   07/26/2019 163 (H)       (goal LDL is <100)   AST (U/L)   Date Value   07/26/2019 21     ALT (U/L)   Date Value   07/26/2019 21     BUN (mg/dL)   Date Value   07/26/2019 12     BP Readings from Last 3 Encounters:   12/18/20 138/88   02/19/20 122/83   02/05/20 125/76          (goal 120/80)    All Future Testing planned in [facility]  Lab Frequency Next Occurrence   Cologuard Once 02/19/2020   Lipid, Fasting Once 01/17/2021   Comprehensive Metabolic Panel Once 69/18/2588   Vitamin D 25 Hydroxy Once 12/18/2020   Hemoglobin A1C Once 12/18/2020   TSH With Reflex Ft4 Once 12/18/2020               Patient Active Problem List:     Essential hypertension     Pure hypercholesterolemia     Precordial pain     Palpitations     Acute bacterial sinusitis     Cerebrovascular accident without paresis ([address]     Ear pressure, right     Adiposity     Anxiety     Closed nondisplaced fracture of shaft of

## 2021-01-06 ENCOUNTER — TELEPHONE (OUTPATIENT)
Dept: FAMILY MEDICINE CLINIC | Age: 61
End: 2021-01-06

## 2021-01-06 NOTE — TELEPHONE ENCOUNTER
Patient states that she fell 2 weeks ago, and hurt her right big toe. She was wondering if she could get an order for an X-Ray sent to [facility]. Patient stated she isn't coming in to be seen cause she will have to pay  A $30 copay for a visit for her toe. When all she needs is an x ray.  Please advise Thanks

## 2021-01-11 ENCOUNTER — OFFICE VISIT (OUTPATIENT)
Dept: ORTHOPEDIC SURGERY | Age: 61
End: 2021-01-11
Payer: COMMERCIAL

## 2021-01-11 VITALS — WEIGHT: 180 LBS | TEMPERATURE: 97.2 F | HEIGHT: 68 IN | RESPIRATION RATE: 12 BRPM | BODY MASS INDEX: 27.28 KG/M2

## 2021-01-11 DIAGNOSIS — M79.671 RIGHT FOOT PAIN: Primary | ICD-10-CM

## 2021-01-11 DIAGNOSIS — S92.411A CLOSED DISPLACED FRACTURE OF PROXIMAL PHALANX OF RIGHT GREAT TOE, INITIAL ENCOUNTER: Primary | ICD-10-CM

## 2021-01-11 PROCEDURE — 99203 OFFICE O/P NEW LOW 30 MIN: CPT | Performed by: ORTHOPAEDIC SURGERY

## 2021-01-11 NOTE — PROGRESS NOTES
  atenolol (TENORMIN) 50 MG tablet, TAKE ONE TABLET BY MOUTH DAILY, Disp: 30 tablet, Rfl: 0    vitamin B-6 (PYRIDOXINE) 50 MG tablet, Take 50 mg by mouth daily, Disp: , Rfl:     Cyanocobalamin (VITAMIN B 12 PO), Take by mouth, Disp: , Rfl:     folic acid (FOLVITE) 1 MG tablet, Take 1 mg by mouth daily, Disp: , Rfl:     aspirin 325 MG tablet, Take 325 mg by mouth daily, Disp: , Rfl:     albuterol (PROVENTIL) (2.5 MG/3ML) 0.083% nebulizer solution, Take 3 mLs by nebulization every 4 hours as needed for Wheezing or Shortness of Breath (Patient not taking: Reported on 12/18/2020), Disp: 1 Package, Rfl: 2    famciclovir (FAMVIR) 500 MG tablet, TAKE ONE TABLET BY MOUTH TWICE A DAY, Disp: , Rfl:     magnesium oxide (MAG-OX) 400 MG tablet, Take 400 mg by mouth daily, Disp: , Rfl:     albuterol sulfate (PROAIR RESPICLICK) 942 (90 Base) MCG/ACT aerosol powder inhalation, Inhale 2 puffs into the lungs every 6 hours as needed for Wheezing or Shortness of Breath (Patient not taking: Reported on 12/18/2020), Disp: 2 Inhaler, Rfl: 1    valsartan (DIOVAN) 160 MG tablet, Take 1 tablet by mouth daily, Disp: 90 tablet, Rfl: 3    pantoprazole sodium (PROTONIX) 40 MG PACK packet, Take 1 packet by mouth 2 times daily (before meals), Disp: 60 each, Rfl: 1    atorvastatin (LIPITOR) 80 MG tablet, Take 1 tablet by mouth daily, Disp: 90 tablet, Rfl: 3    fluticasone (FLONASE) 50 MCG/ACT nasal spray, 1 spray by Nasal route daily, Disp: , Rfl:      Allergies:    Sulfamethoxazole-trimethoprim and Venlafaxine    Family History:  family history includes Cancer in her mother; Diabetes in her father; Heart Disease in her father and maternal uncle; High Blood Pressure in her father; High Cholesterol in her father; Other in her mother and sister.     Social History:   Social History     Occupational History    Not on file   Tobacco Use    Smoking status: Never Smoker    Smokeless tobacco: Never Used   Substance and Sexual Activity  Alcohol use: Yes     Comment: 1 per week    Drug use: No    Sexual activity: Not on file     Occupation: RN     OBJECTIVE:  Temp 97.2 °F (36.2 °C)   Resp 12   Ht 5' 8\" (1.727 m)   Wt 180 lb (81.6 kg)   BMI 27.37 kg/m²    Psych: alert and oriented to person, time, and place  Cardio:  well perfused extremities  Resp:  normal respiratory effort  Skin:  no cyanosis  Hem/lymph:  no lymphedema  Neuro:  sensation to light touch grossly intact throughout all nerve distributions in the foot   Musculoskeletal:    MUSCULOSKELETAL (affected lower extremity):  Vascular: Toes warm and well perfused, compartments soft/compressible, mild swelling of ankle/foot. Skin:  Intact over foot/ankle, without rash/lesions/AV malformations. Motion: Able to wiggle toes  -Range of motion not tested due to pain  -No tenderness at knee or proximal leg  -Tenderness to palpation: Great toe   -Able to fire EHL/FHL      RADIOLOGY:   1/11/2021 FINDINGS:  Three views (AP, Mortise, and Lateral) of the right ankle and three views (AP, Oblique, Lateral) of the right foot were obtained in the office today and reviewed, revealing fracture at the great toe proximal phalanx with mild displacement. IMPRESSION:  Osseous injury as above. Electronically signed by Milton Verduzco MD      No results found. ASSESSMENT AND PLAN:  Body mass index is 27.37 kg/m². She has a right great toe proximal phalanx fracture with mild displacement, sustained on 12/25/2020. Notably, she has a somewhat complex past medical history. She has a history of a CVA, and MTHFR deficiency. We had a discussion today about the likely diagnosis and its natural history, physical exam and imaging findings, as well as various treatment options in detail. Surgically, we discussed operative fixation. We also discussed the possibility of lesser toe impingement and possible future Akin osteotomy, depending on future displacement. We discussed both surgical and nonsurgical treatments, including risks and benefits. As a result of our discussion, the patient was able to make an informed decision, and has elected to proceed with nonoperative management. Orders/referrals were placed as below at today's visit. The patient will avoid the routine use of NSAIDs to prevent the theoretical risk of delayed healing. The patient will avoid pain provoking activity. The patient will use the following for stability and pain control, when ambulatory:   heel wedge cast shoe. All questions were answered and the above plan was agreed upon. The patient will return to clinic in 1 month with repeat foot x-rays. At the patient's next visit, depending on how the patient is doing and/or new imaging/labs results, we may consider the following options:    []  Orthotic (OTC)     []  Orthotic (custom)          []  Rocker bottom shoes     []  Brace (OTC)        []  Brace (custom)             []  CAM boot        []  Night splint         []  Heel cups        []  Strap      []  Toe sleeves/splints    []  PT:                     []  Wean out of immobilization   []  Advance activity       []  Topical               []  NSAIDs          []  Inocencia         []  Referral:         []  Stress xrays       []  CT         []  MRI        []  Injection:         []  Consider OR      []  Pick OR date    Return in about 4 weeks (around 2/8/2021). No orders of the defined types were placed in this encounter. No orders of the defined types were placed in this encounter.         Arline Lane MD  Orthopedic Surgery Please excuse any typos/errors, as this note was created with the assistance of voice recognition software. While intending to generate a document that actually reflects the content of the visit, the document can still have some errors including those of syntax and sound-a-like substitutions which may escape proof reading. In such instances, actual meaning can be extrapolated by context.

## 2021-01-11 NOTE — LETTER
Dr. Frank Bumpers [address] [facility] [address]  [address] [facility] [address]  [phone]        1/11/2021     Patient: Joya Jimenez  YOB: 1960    Dear Nicole GARY PA-C,    I had the pleasure of seeing one of your patients, Joya Jimenez recently in the office. Below are the relevant portions of my assessment and plan of care. ASSESSMENT AND PLAN:  She has a right great toe proximal phalanx fracture with mild displacement, sustained on 12/25/2020. Notably, she has a somewhat complex past medical history. She has a history of a CVA, and MTHFR deficiency. We had a discussion today about the likely diagnosis and its natural history, physical exam and imaging findings, as well as various treatment options in detail. Surgically, we discussed operative fixation. We also discussed the possibility of lesser toe impingement and possible future Akin osteotomy, depending on future displacement. We discussed both surgical and nonsurgical treatments, including risks and benefits. As a result of our discussion, the patient was able to make an informed decision, and has elected to proceed with nonoperative management. Orders/referrals were placed as below at today's visit. The patient will avoid the routine use of NSAIDs to prevent the theoretical risk of delayed healing. The patient will avoid pain provoking activity. The patient will use the following for stability and pain control, when ambulatory:   heel wedge cast shoe. All questions were answered and the above plan was agreed upon. The patient will return to clinic in 1 month with repeat foot x-rays. I look forward to serving you and your patients again in the future. Please don't hesitate to contact me at my mobile number .         Madhu Chung MD  Orthopedic Surgery

## 2021-01-24 DIAGNOSIS — F41.9 ANXIETY: ICD-10-CM

## 2021-01-24 DIAGNOSIS — I10 ESSENTIAL HYPERTENSION: ICD-10-CM

## 2021-01-25 RX ORDER — ATENOLOL 50 MG/1
TABLET ORAL
Qty: 30 TABLET | Refills: 0 | Status: SHIPPED | OUTPATIENT
Start: 2021-01-25 | End: 2021-02-24

## 2021-01-25 RX ORDER — CITALOPRAM 40 MG/1
TABLET ORAL
Qty: 30 TABLET | Refills: 0 | Status: SHIPPED | OUTPATIENT
Start: 2021-01-25 | End: 2021-02-24

## 2021-01-25 NOTE — TELEPHONE ENCOUNTER
Next Visit Date:  Future Appointments   Date Time Provider Taylor Zambrano   2/9/2021  8:00 AM Jorden Montero, [address] Maintenance   Topic Date Due    Shingles Vaccine (1 of 2) 11/14/2012    Colon cancer screen colonoscopy  11/14/2012    A1C test (Diabetic or Prediabetic)  07/26/2020    Potassium monitoring  07/26/2020    Creatinine monitoring  07/26/2020    Lipid screen  10/26/2020    Cervical cancer screen  12/18/2021 (Originally 11/14/1983)    Flu vaccine (1) 12/18/2021 (Originally 9/1/2020)    Hepatitis C screen  12/18/2021 (Originally 1960)    HIV screen  12/18/2021 (Originally 11/14/1977)    Breast cancer screen  04/07/2021    DTaP/Tdap/Td vaccine (3 - Td) 08/01/2026    Hepatitis A vaccine  Aged Out    Hepatitis B vaccine  Aged Out    Hib vaccine  Aged Out    Meningococcal (ACWY) vaccine  Aged Out    Pneumococcal 0-64 years Vaccine  Aged Out       Hemoglobin A1C (%)   Date Value   07/26/2019 5.9             ( goal A1C is < 7)   No results found for: LABMICR  LDL Cholesterol (mg/dL)   Date Value   07/26/2019 163 (H)       (goal LDL is <100)   AST (U/L)   Date Value   07/26/2019 21     ALT (U/L)   Date Value   07/26/2019 21     BUN (mg/dL)   Date Value   07/26/2019 12     BP Readings from Last 3 Encounters:   12/18/20 138/88   02/19/20 122/83   02/05/20 125/76          (goal 120/80)    All Future Testing planned in [facility]  Lab Frequency Next Occurrence   Cologuard Once 02/19/2020   Lipid, Fasting Once 01/17/2021   Comprehensive Metabolic Panel Once 25/12/5941   Vitamin D 25 Hydroxy Once 12/18/2020   Hemoglobin A1C Once 12/18/2020   TSH With Reflex Ft4 Once 12/18/2020               Patient Active Problem List:     Essential hypertension     Pure hypercholesterolemia     Precordial pain     Palpitations     Acute bacterial sinusitis     Cerebrovascular accident without paresis ([address]     Ear pressure, right     Adiposity     Anxiety     Closed nondisplaced fracture of shaft of right clavicle     Daytime somnolence     Obstructive sleep apnea syndrome     Nocturnal hypoxemia     Major depression, single episode     Hx of nasal polyp     Hiatal hernia     Headache     Gastroesophageal reflux disease with esophagitis     Gastroesophageal reflux disease     Flexural eczema     Seasonal allergic rhinitis due to pollen     Restless legs syndrome     Psoriasis     Pharyngeal dysphagia     Hypercholesterolemia     Secondary hypertension     Cerebral infarction ([address]     MTHFR (methylene THF reductase) deficiency and homocystinuria ([address]

## 2021-02-11 DIAGNOSIS — Z12.11 COLON CANCER SCREENING: ICD-10-CM

## 2021-02-23 DIAGNOSIS — F41.9 ANXIETY: ICD-10-CM

## 2021-02-23 DIAGNOSIS — I10 ESSENTIAL HYPERTENSION: ICD-10-CM

## 2021-02-24 RX ORDER — ATENOLOL 50 MG/1
TABLET ORAL
Qty: 30 TABLET | Refills: 0 | Status: SHIPPED | OUTPATIENT
Start: 2021-02-24 | End: 2021-03-25

## 2021-02-24 RX ORDER — CITALOPRAM 40 MG/1
TABLET ORAL
Qty: 30 TABLET | Refills: 0 | Status: SHIPPED | OUTPATIENT
Start: 2021-02-24 | End: 2021-03-25

## 2021-02-24 NOTE — TELEPHONE ENCOUNTER
Called patient to make appt, she states she is looking for a new provider, since the last provider left. Next Visit Date:  No future appointments.     Health Maintenance   Topic Date Due    COVID-19 Vaccine (1 of 2) 11/14/1978    Shingles Vaccine (1 of 2) 11/14/2012    A1C test (Diabetic or Prediabetic)  07/26/2020    Potassium monitoring  07/26/2020    Creatinine monitoring  07/26/2020    Lipid screen  10/26/2020    Cervical cancer screen  12/18/2021 (Originally 11/14/1983)    Flu vaccine (1) 12/18/2021 (Originally 9/1/2020)    Hepatitis C screen  12/18/2021 (Originally 1960)    HIV screen  12/18/2021 (Originally 11/14/1977)    Breast cancer screen  04/07/2021    Colon cancer screen fecal DNA test (Cologuard)  02/02/2024    DTaP/Tdap/Td vaccine (3 - Td) 08/01/2026    Hepatitis A vaccine  Aged Out    Hepatitis B vaccine  Aged Out    Hib vaccine  Aged Out    Meningococcal (ACWY) vaccine  Aged Out    Pneumococcal 0-64 years Vaccine  Aged Out       Hemoglobin A1C (%)   Date Value   07/26/2019 5.9             ( goal A1C is < 7)   No results found for: LABMICR  LDL Cholesterol (mg/dL)   Date Value   07/26/2019 163 (H)       (goal LDL is <100)   AST (U/L)   Date Value   07/26/2019 21     ALT (U/L)   Date Value   07/26/2019 21     BUN (mg/dL)   Date Value   07/26/2019 12     BP Readings from Last 3 Encounters:   12/18/20 138/88   02/19/20 122/83   02/05/20 125/76          (goal 120/80)    All Future Testing planned in [facility]  Lab Frequency Next Occurrence   Lipid, Fasting Once 01/17/2021   Comprehensive Metabolic Panel Once 35/14/0424   Vitamin D 25 Hydroxy Once 12/18/2020   Hemoglobin A1C Once 12/18/2020   TSH With Reflex Ft4 Once 12/18/2020               Patient Active Problem List:     Essential hypertension     Pure hypercholesterolemia     Precordial pain     Palpitations     Acute bacterial sinusitis     Cerebrovascular accident without paresis ([address]     Ear pressure, right Pocket flushed with antibiotic solution (Bacitracin). Adiposity     Anxiety     Closed nondisplaced fracture of shaft of right clavicle     Daytime somnolence     Obstructive sleep apnea syndrome     Nocturnal hypoxemia     Major depression, single episode     Hx of nasal polyp     Hiatal hernia     Headache     Gastroesophageal reflux disease with esophagitis     Gastroesophageal reflux disease     Flexural eczema     Seasonal allergic rhinitis due to pollen     Restless legs syndrome     Psoriasis     Pharyngeal dysphagia     Hypercholesterolemia     Secondary hypertension     Cerebral infarction ([address]     MTHFR (methylene THF reductase) deficiency and homocystinuria ([address]

## 2021-03-01 DIAGNOSIS — E78.00 PURE HYPERCHOLESTEROLEMIA: ICD-10-CM

## 2021-03-01 RX ORDER — ATORVASTATIN CALCIUM 80 MG/1
80 TABLET, FILM COATED ORAL DAILY
Qty: 90 TABLET | Refills: 3 | Status: SHIPPED | OUTPATIENT
Start: 2021-03-01

## 2021-03-01 NOTE — TELEPHONE ENCOUNTER
Received faxed medication refill request, prescription pended. Please advise, thank you! Next Visit Date:  No future appointments.     Health Maintenance   Topic Date Due    COVID-19 Vaccine (1 of 2) 11/14/1978    Shingles Vaccine (1 of 2) 11/14/2012    A1C test (Diabetic or Prediabetic)  07/26/2020    Potassium monitoring  07/26/2020    Creatinine monitoring  07/26/2020    Lipid screen  10/26/2020    Cervical cancer screen  12/18/2021 (Originally 11/14/1983)    Flu vaccine (1) 12/18/2021 (Originally 9/1/2020)    Hepatitis C screen  12/18/2021 (Originally 1960)    HIV screen  12/18/2021 (Originally 11/14/1977)    Breast cancer screen  04/07/2021    Colon cancer screen fecal DNA test (Cologuard)  02/02/2024    DTaP/Tdap/Td vaccine (3 - Td) 08/01/2026    Hepatitis A vaccine  Aged Out    Hepatitis B vaccine  Aged Out    Hib vaccine  Aged Out    Meningococcal (ACWY) vaccine  Aged Out    Pneumococcal 0-64 years Vaccine  Aged Out       Hemoglobin A1C (%)   Date Value   07/26/2019 5.9             ( goal A1C is < 7)   No results found for: LABMICR  LDL Cholesterol (mg/dL)   Date Value   07/26/2019 163 (H)       (goal LDL is <100)   AST (U/L)   Date Value   07/26/2019 21     ALT (U/L)   Date Value   07/26/2019 21     BUN (mg/dL)   Date Value   07/26/2019 12     BP Readings from Last 3 Encounters:   12/18/20 138/88   02/19/20 122/83   02/05/20 125/76          (goal 120/80)    All Future Testing planned in [facility]  Lab Frequency Next Occurrence   Lipid, Fasting Once 01/17/2021   Comprehensive Metabolic Panel Once 15/03/1735   Vitamin D 25 Hydroxy Once 12/18/2020   Hemoglobin A1C Once 12/18/2020   TSH With Reflex Ft4 Once 12/18/2020               Patient Active Problem List:     Essential hypertension     Pure hypercholesterolemia     Precordial pain     Palpitations     Acute bacterial sinusitis     Cerebrovascular accident without paresis ([address]     Ear pressure, right     Adiposity

## 2021-03-25 DIAGNOSIS — I10 ESSENTIAL HYPERTENSION: ICD-10-CM

## 2021-03-25 DIAGNOSIS — F41.9 ANXIETY: ICD-10-CM

## 2021-03-25 RX ORDER — CITALOPRAM 40 MG/1
TABLET ORAL
Qty: 30 TABLET | Refills: 0 | Status: SHIPPED | OUTPATIENT
Start: 2021-03-25 | End: 2021-04-27

## 2021-03-25 RX ORDER — ATENOLOL 50 MG/1
TABLET ORAL
Qty: 30 TABLET | Refills: 0 | Status: SHIPPED | OUTPATIENT
Start: 2021-03-25 | End: 2021-04-27

## 2021-03-25 NOTE — TELEPHONE ENCOUNTER
Electronic medication refill request. Pharmacy on file. Please advise. Next Visit Date:  No future appointments.     Health Maintenance   Topic Date Due    COVID-19 Vaccine (1) Never done    Shingles Vaccine (1 of 2) Never done    A1C test (Diabetic or Prediabetic)  07/26/2020    Potassium monitoring  07/26/2020    Creatinine monitoring  07/26/2020    Lipid screen  10/26/2020    Breast cancer screen  04/07/2021    Cervical cancer screen  12/18/2021 (Originally 11/14/1983)    Flu vaccine (1) 12/18/2021 (Originally 9/1/2020)    Hepatitis C screen  12/18/2021 (Originally 1960)    HIV screen  12/18/2021 (Originally 11/14/1977)    Colon cancer screen fecal DNA test (Cologuard)  02/02/2024    DTaP/Tdap/Td vaccine (3 - Td) 08/01/2026    Hepatitis A vaccine  Aged Out    Hepatitis B vaccine  Aged Out    Hib vaccine  Aged Out    Meningococcal (ACWY) vaccine  Aged Out    Pneumococcal 0-64 years Vaccine  Aged Out       Hemoglobin A1C (%)   Date Value   07/26/2019 5.9             ( goal A1C is < 7)   No results found for: LABMICR  LDL Cholesterol (mg/dL)   Date Value   07/26/2019 163 (H)       (goal LDL is <100)   AST (U/L)   Date Value   07/26/2019 21     ALT (U/L)   Date Value   07/26/2019 21     BUN (mg/dL)   Date Value   07/26/2019 12     BP Readings from Last 3 Encounters:   12/18/20 138/88   02/19/20 122/83   02/05/20 125/76          (goal 120/80)    All Future Testing planned in [facility]  Lab Frequency Next Occurrence   Lipid, Fasting Once 01/17/2021   Comprehensive Metabolic Panel Once 12/75/5522   Vitamin D 25 Hydroxy Once 12/18/2020   Hemoglobin A1C Once 12/18/2020   TSH With Reflex Ft4 Once 12/18/2020               Patient Active Problem List:     Essential hypertension     Pure hypercholesterolemia     Precordial pain     Palpitations     Acute bacterial sinusitis     Cerebrovascular accident without paresis ([address]     Ear pressure, right     Adiposity     Anxiety     Closed nondisplaced fracture of shaft of right clavicle     Daytime somnolence     Obstructive sleep apnea syndrome     Nocturnal hypoxemia     Major depression, single episode     Hx of nasal polyp     Hiatal hernia     Headache     Gastroesophageal reflux disease with esophagitis     Gastroesophageal reflux disease     Flexural eczema     Seasonal allergic rhinitis due to pollen     Restless legs syndrome     Psoriasis     Pharyngeal dysphagia     Hypercholesterolemia     Secondary hypertension     Cerebral infarction ([address]     MTHFR (methylene THF reductase) deficiency and homocystinuria ([address]

## 2021-04-01 RX ORDER — FOLIC ACID 1 MG/1
1 TABLET ORAL DAILY
Qty: 30 TABLET | Refills: 0 | Status: SHIPPED | OUTPATIENT
Start: 2021-04-01

## 2021-04-01 NOTE — TELEPHONE ENCOUNTER
Received faxed medication refill request, prescription pended. Please advise, thank you! Next Visit Date:  No future appointments.     Health Maintenance   Topic Date Due    COVID-19 Vaccine (1) Never done    Shingles Vaccine (1 of 2) Never done    A1C test (Diabetic or Prediabetic)  07/26/2020    Potassium monitoring  07/26/2020    Creatinine monitoring  07/26/2020    Lipid screen  10/26/2020    Breast cancer screen  04/07/2021    Cervical cancer screen  12/18/2021 (Originally 11/14/1983)    Flu vaccine (Season Ended) 12/18/2021 (Originally 9/1/2021)    Hepatitis C screen  12/18/2021 (Originally 1960)    HIV screen  12/18/2021 (Originally 11/14/1977)    Colon cancer screen fecal DNA test (Cologuard)  02/02/2024    DTaP/Tdap/Td vaccine (3 - Td) 08/01/2026    Hepatitis A vaccine  Aged Out    Hepatitis B vaccine  Aged Out    Hib vaccine  Aged Out    Meningococcal (ACWY) vaccine  Aged Out    Pneumococcal 0-64 years Vaccine  Aged Out       Hemoglobin A1C (%)   Date Value   07/26/2019 5.9             ( goal A1C is < 7)   No results found for: LABMICR  LDL Cholesterol (mg/dL)   Date Value   07/26/2019 163 (H)       (goal LDL is <100)   AST (U/L)   Date Value   07/26/2019 21     ALT (U/L)   Date Value   07/26/2019 21     BUN (mg/dL)   Date Value   07/26/2019 12     BP Readings from Last 3 Encounters:   12/18/20 138/88   02/19/20 122/83   02/05/20 125/76          (goal 120/80)    All Future Testing planned in [facility]  Lab Frequency Next Occurrence   Lipid, Fasting Once 01/17/2021   Comprehensive Metabolic Panel Once 24/43/8484   Vitamin D 25 Hydroxy Once 12/18/2020   Hemoglobin A1C Once 12/18/2020   TSH With Reflex Ft4 Once 12/18/2020               Patient Active Problem List:     Essential hypertension     Pure hypercholesterolemia     Precordial pain     Palpitations     Acute bacterial sinusitis     Cerebrovascular accident without paresis ([address]     Ear pressure, right     Adiposity Anxiety     Closed nondisplaced fracture of shaft of right clavicle     Daytime somnolence     Obstructive sleep apnea syndrome     Nocturnal hypoxemia     Major depression, single episode     Hx of nasal polyp     Hiatal hernia     Headache     Gastroesophageal reflux disease with esophagitis     Gastroesophageal reflux disease     Flexural eczema     Seasonal allergic rhinitis due to pollen     Restless legs syndrome     Psoriasis     Pharyngeal dysphagia     Hypercholesterolemia     Secondary hypertension     Cerebral infarction ([address]     MTHFR (methylene THF reductase) deficiency and homocystinuria ([address]

## 2021-04-24 DIAGNOSIS — F41.9 ANXIETY: ICD-10-CM

## 2021-04-24 DIAGNOSIS — I10 ESSENTIAL HYPERTENSION: ICD-10-CM

## 2021-04-27 RX ORDER — ATENOLOL 50 MG/1
TABLET ORAL
Qty: 30 TABLET | Refills: 0 | Status: SHIPPED | OUTPATIENT
Start: 2021-04-27

## 2021-04-27 RX ORDER — CITALOPRAM 40 MG/1
TABLET ORAL
Qty: 30 TABLET | Refills: 0 | Status: SHIPPED | OUTPATIENT
Start: 2021-04-27 | End: 2021-06-23

## 2021-04-27 NOTE — TELEPHONE ENCOUNTER
Patient will start seeing new provider on Thursday. Do not want to make a follow up    Next Visit Date:  No future appointments.     Health Maintenance   Topic Date Due    COVID-19 Vaccine (1) Never done    Shingles Vaccine (1 of 2) Never done    A1C test (Diabetic or Prediabetic)  07/26/2020    Potassium monitoring  07/26/2020    Creatinine monitoring  07/26/2020    Lipid screen  10/26/2020    Breast cancer screen  04/07/2021    Cervical cancer screen  12/18/2021 (Originally 11/14/1983)    Flu vaccine (Season Ended) 12/18/2021 (Originally 9/1/2021)    Hepatitis C screen  12/18/2021 (Originally 1960)    HIV screen  12/18/2021 (Originally 11/14/1977)    Colon cancer screen fecal DNA test (Cologuard)  02/02/2024    DTaP/Tdap/Td vaccine (3 - Td) 08/01/2026    Hepatitis A vaccine  Aged Out    Hepatitis B vaccine  Aged Out    Hib vaccine  Aged Out    Meningococcal (ACWY) vaccine  Aged Out    Pneumococcal 0-64 years Vaccine  Aged Out       Hemoglobin A1C (%)   Date Value   07/26/2019 5.9             ( goal A1C is < 7)   No results found for: LABMICR  LDL Cholesterol (mg/dL)   Date Value   07/26/2019 163 (H)       (goal LDL is <100)   AST (U/L)   Date Value   07/26/2019 21     ALT (U/L)   Date Value   07/26/2019 21     BUN (mg/dL)   Date Value   07/26/2019 12     BP Readings from Last 3 Encounters:   12/18/20 138/88   02/19/20 122/83   02/05/20 125/76          (goal 120/80)    All Future Testing planned in [facility]  Lab Frequency Next Occurrence   Lipid, Fasting Once 01/17/2021   Comprehensive Metabolic Panel Once 40/83/9739   Vitamin D 25 Hydroxy Once 12/18/2020   Hemoglobin A1C Once 12/18/2020   TSH With Reflex Ft4 Once 12/18/2020               Patient Active Problem List:     Essential hypertension     Pure hypercholesterolemia     Precordial pain     Palpitations     Acute bacterial sinusitis     Cerebrovascular accident without paresis ([address]     Ear pressure, right     Adiposity     Anxiety Closed nondisplaced fracture of shaft of right clavicle     Daytime somnolence     Obstructive sleep apnea syndrome     Nocturnal hypoxemia     Major depression, single episode     Hx of nasal polyp     Hiatal hernia     Headache     Gastroesophageal reflux disease with esophagitis     Gastroesophageal reflux disease     Flexural eczema     Seasonal allergic rhinitis due to pollen     Restless legs syndrome     Psoriasis     Pharyngeal dysphagia     Hypercholesterolemia     Secondary hypertension     Cerebral infarction ([address]     MTHFR (methylene THF reductase) deficiency and homocystinuria ([address]

## 2021-06-22 DIAGNOSIS — F41.9 ANXIETY: ICD-10-CM

## 2021-06-22 NOTE — TELEPHONE ENCOUNTER
Left voicemail for patient to return call to schedule an appointment. Patient needs to establish care with a new provider at our office. Next Visit Date:  No future appointments.     Health Maintenance   Topic Date Due    COVID-19 Vaccine (1) Never done    Shingles Vaccine (1 of 2) Never done    A1C test (Diabetic or Prediabetic)  07/26/2020    Potassium monitoring  07/26/2020    Creatinine monitoring  07/26/2020    Lipid screen  10/26/2020    Breast cancer screen  04/07/2021    Cervical cancer screen  12/18/2021 (Originally 11/14/1983)    Flu vaccine (Season Ended) 12/18/2021 (Originally 9/1/2021)    Hepatitis C screen  12/18/2021 (Originally 1960)    HIV screen  12/18/2021 (Originally 11/14/1977)    Colon cancer screen fecal DNA test (Cologuard)  02/02/2024    DTaP/Tdap/Td vaccine (3 - Td or Tdap) 08/01/2026    Hepatitis A vaccine  Aged Out    Hepatitis B vaccine  Aged Out    Hib vaccine  Aged Out    Meningococcal (ACWY) vaccine  Aged Out    Pneumococcal 0-64 years Vaccine  Aged Out       Hemoglobin A1C (%)   Date Value   07/26/2019 5.9             ( goal A1C is < 7)   No results found for: LABMICR  LDL Cholesterol (mg/dL)   Date Value   07/26/2019 163 (H)       (goal LDL is <100)   AST (U/L)   Date Value   07/26/2019 21     ALT (U/L)   Date Value   07/26/2019 21     BUN (mg/dL)   Date Value   07/26/2019 12     BP Readings from Last 3 Encounters:   12/18/20 138/88   02/19/20 122/83   02/05/20 125/76          (goal 120/80)    All Future Testing planned in [facility]              Patient Active Problem List:     Essential hypertension     Pure hypercholesterolemia     Precordial pain     Palpitations     Acute bacterial sinusitis     Cerebrovascular accident without paresis (HCC)     Ear pressure, right     Adiposity     Anxiety     Closed nondisplaced fracture of shaft of right clavicle     Daytime somnolence     Obstructive sleep apnea syndrome     Nocturnal hypoxemia     Major depression, single episode     Hx of nasal polyp     Hiatal hernia     Headache     Gastroesophageal reflux disease with esophagitis     Gastroesophageal reflux disease     Flexural eczema     Seasonal allergic rhinitis due to pollen     Restless legs syndrome     Psoriasis     Pharyngeal dysphagia     Hypercholesterolemia     Secondary hypertension     Cerebral infarction ([address]     MTHFR (methylene THF reductase) deficiency and homocystinuria ([address]

## 2021-06-23 RX ORDER — CITALOPRAM 40 MG/1
TABLET ORAL
Qty: 30 TABLET | Refills: 0 | Status: SHIPPED | OUTPATIENT
Start: 2021-06-23

## 2025-01-07 ENCOUNTER — HOSPITAL ENCOUNTER (OUTPATIENT)
Age: 65
Setting detail: SPECIMEN
Discharge: HOME | End: 2025-01-07

## 2025-01-07 ENCOUNTER — OFFICE VISIT (OUTPATIENT)
Age: 65
End: 2025-01-07
Payer: COMMERCIAL

## 2025-01-07 VITALS — SYSTOLIC BLOOD PRESSURE: 132 MMHG | DIASTOLIC BLOOD PRESSURE: 86 MMHG | OXYGEN SATURATION: 98 % | HEART RATE: 63 BPM

## 2025-01-07 DIAGNOSIS — Z78.0 MENOPAUSE: ICD-10-CM

## 2025-01-07 DIAGNOSIS — Z01.419 WELL WOMAN EXAM WITH ROUTINE GYNECOLOGICAL EXAM: Primary | ICD-10-CM

## 2025-01-07 DIAGNOSIS — Z12.31 ENCOUNTER FOR SCREENING MAMMOGRAM FOR MALIGNANT NEOPLASM OF BREAST: ICD-10-CM

## 2025-01-07 PROCEDURE — 3075F SYST BP GE 130 - 139MM HG: CPT

## 2025-01-07 PROCEDURE — 3079F DIAST BP 80-89 MM HG: CPT

## 2025-01-07 PROCEDURE — 99396 PREV VISIT EST AGE 40-64: CPT

## 2025-01-07 RX ORDER — ALPRAZOLAM 0.5 MG
0.5 TABLET ORAL PRN
COMMUNITY

## 2025-01-07 RX ORDER — ACETAMINOPHEN 160 MG
2000 TABLET,DISINTEGRATING ORAL DAILY
COMMUNITY

## 2025-01-07 ASSESSMENT — ENCOUNTER SYMPTOMS: GASTROINTESTINAL NEGATIVE: 1

## 2025-01-07 NOTE — PROGRESS NOTES
anemia, discharge, prior STI's, last pap smear, colposcopy, & other screenings including mammogram, dexascan, & colonoscopy as necessary.      ROS:  Review of Systems   Gastrointestinal: Negative.    Genitourinary: Negative.    All other systems reviewed and are negative.      PHYSICAL EXAM:  /86 (Site: Right Upper Arm, Position: Sitting, Cuff Size: Medium Adult)   Pulse 63   SpO2 98%     Physical Exam  Constitutional:       Appearance: Normal appearance. She is normal weight.   Genitourinary:      Bladder, rectum and urethral meatus normal.      No lesions in the vagina.      Genitourinary Comments: Cervical stenosis      Right Labia: No rash, tenderness, lesions or skin changes.     Left Labia: No tenderness, lesions, skin changes or rash.     No vaginal tenderness.      No vaginal prolapse present.     No vaginal atrophy present.       Right Adnexa: not tender and no mass present.     Left Adnexa: not tender and no mass present.     No cervical lesion.      Uterus is not enlarged or tender.      No uterine mass detected.     No urethral tenderness or mass present.      Pelvic Floor comments: Pelvic cavity: Transvaginal digital palpation and Kegel's squeeze palpable.  Myalgia and myositis of the pelvic floor not present to contra-indicate a procedure.  No significant complications or exposures of any prior placed mesh.  No significant pelvic organ prolapse contraindicating a procedure..  Breasts:     Right: Normal. No swelling, inverted nipple, mass, nipple discharge, skin change or tenderness.      Left: Normal. No swelling, inverted nipple, mass, nipple discharge, skin change or tenderness.   HENT:      Head: Normocephalic and atraumatic.      Right Ear: External ear normal.      Left Ear: External ear normal.      Nose: Nose normal.      Mouth/Throat:      Mouth: Mucous membranes are moist.      Pharynx: Oropharynx is clear.   Eyes:      Extraocular Movements: Extraocular movements intact.

## 2025-01-09 LAB
HPV I/H RISK 4 DNA CVX QL NAA+PROBE: NOT DETECTED
HPV SAMPLE: NORMAL
HPV, INTERPRETATION: NORMAL
HPV16 DNA CVX QL NAA+PROBE: NOT DETECTED
HPV18 DNA CVX QL NAA+PROBE: NOT DETECTED
SPECIMEN DESCRIPTION: NORMAL

## 2025-01-15 LAB — CYTOLOGY REPORT: NORMAL
